# Patient Record
Sex: FEMALE | NOT HISPANIC OR LATINO | URBAN - METROPOLITAN AREA
[De-identification: names, ages, dates, MRNs, and addresses within clinical notes are randomized per-mention and may not be internally consistent; named-entity substitution may affect disease eponyms.]

---

## 2021-11-04 ENCOUNTER — APPOINTMENT (RX ONLY)
Dept: URBAN - METROPOLITAN AREA CLINIC 349 | Facility: CLINIC | Age: 76
Setting detail: DERMATOLOGY
End: 2021-11-04

## 2021-11-04 DIAGNOSIS — D22 MELANOCYTIC NEVI: ICD-10-CM

## 2021-11-04 DIAGNOSIS — Z12.83 ENCOUNTER FOR SCREENING FOR MALIGNANT NEOPLASM OF SKIN: ICD-10-CM

## 2021-11-04 DIAGNOSIS — Z71.89 OTHER SPECIFIED COUNSELING: ICD-10-CM

## 2021-11-04 DIAGNOSIS — L30.9 DERMATITIS, UNSPECIFIED: ICD-10-CM | Status: INADEQUATELY CONTROLLED

## 2021-11-04 DIAGNOSIS — L82.1 OTHER SEBORRHEIC KERATOSIS: ICD-10-CM

## 2021-11-04 DIAGNOSIS — D18.0 HEMANGIOMA: ICD-10-CM

## 2021-11-04 DIAGNOSIS — I78.8 OTHER DISEASES OF CAPILLARIES: ICD-10-CM | Status: INADEQUATELY CONTROLLED

## 2021-11-04 PROBLEM — D22.5 MELANOCYTIC NEVI OF TRUNK: Status: ACTIVE | Noted: 2021-11-04

## 2021-11-04 PROBLEM — D18.01 HEMANGIOMA OF SKIN AND SUBCUTANEOUS TISSUE: Status: ACTIVE | Noted: 2021-11-04

## 2021-11-04 PROCEDURE — ? PRESCRIPTION MEDICATION MANAGEMENT

## 2021-11-04 PROCEDURE — 17110 DESTRUCTION B9 LES UP TO 14: CPT

## 2021-11-04 PROCEDURE — ? EDUCATIONAL RESOURCES PROVIDED

## 2021-11-04 PROCEDURE — 99203 OFFICE O/P NEW LOW 30 MIN: CPT | Mod: 25

## 2021-11-04 PROCEDURE — ? ELECTRODESICCATION

## 2021-11-04 PROCEDURE — ? COUNSELING

## 2021-11-04 PROCEDURE — ? PRESCRIPTION

## 2021-11-04 RX ORDER — TRIAMCINOLONE ACETONIDE 1 MG/G
CREAM TOPICAL
Qty: 80 | Refills: 1 | Status: ERX | COMMUNITY
Start: 2021-11-04

## 2021-11-04 RX ADMIN — TRIAMCINOLONE ACETONIDE: 1 CREAM TOPICAL at 00:00

## 2021-11-04 ASSESSMENT — LOCATION SIMPLE DESCRIPTION DERM
LOCATION SIMPLE: RIGHT UPPER BACK
LOCATION SIMPLE: RIGHT POSTERIOR UPPER ARM
LOCATION SIMPLE: NOSE
LOCATION SIMPLE: UPPER BACK

## 2021-11-04 ASSESSMENT — LOCATION ZONE DERM
LOCATION ZONE: ARM
LOCATION ZONE: TRUNK
LOCATION ZONE: NOSE

## 2021-11-04 ASSESSMENT — LOCATION DETAILED DESCRIPTION DERM
LOCATION DETAILED: NASAL DORSUM
LOCATION DETAILED: RIGHT INFERIOR UPPER BACK
LOCATION DETAILED: RIGHT INFERIOR MEDIAL UPPER BACK
LOCATION DETAILED: RIGHT PROXIMAL POSTERIOR UPPER ARM
LOCATION DETAILED: INFERIOR THORACIC SPINE
LOCATION DETAILED: RIGHT MEDIAL UPPER BACK
LOCATION DETAILED: SUPERIOR THORACIC SPINE
LOCATION DETAILED: RIGHT SUPERIOR UPPER BACK

## 2021-11-04 NOTE — PROCEDURE: ELECTRODESICCATION
Medical Necessity Clause: This procedure was medically necessary because the lesions that were treated were:
Include Z78.9 (Other Specified Conditions Influencing Health Status) As An Associated Diagnosis?: No
Post-Care Instructions: I reviewed with the patient in detail post-care instructions. Patient is to wear sunprotection, and avoid picking at any of the treated lesions. Pt may apply Vaseline to crusted or scabbing areas
Consent: The patient's consent was obtained including but not limited to risks of crusting, scabbing, blistering, scarring, darker or lighter pigmentary change, recurrence, incomplete removal and infection.
Detail Level: Detailed
Medical Necessity Information: It is in your best interest to select a reason for this procedure from the list below. All of these items fulfill various CMS LCD requirements except the new and changing color options.

## 2022-11-04 ENCOUNTER — APPOINTMENT (RX ONLY)
Dept: URBAN - METROPOLITAN AREA CLINIC 329 | Facility: CLINIC | Age: 77
Setting detail: DERMATOLOGY
End: 2022-11-04

## 2022-11-04 DIAGNOSIS — L57.8 OTHER SKIN CHANGES DUE TO CHRONIC EXPOSURE TO NONIONIZING RADIATION: ICD-10-CM

## 2022-11-04 DIAGNOSIS — L71.8 OTHER ROSACEA: ICD-10-CM | Status: INADEQUATELY CONTROLLED

## 2022-11-04 DIAGNOSIS — D18.0 HEMANGIOMA: ICD-10-CM

## 2022-11-04 DIAGNOSIS — L82.0 INFLAMED SEBORRHEIC KERATOSIS: ICD-10-CM

## 2022-11-04 DIAGNOSIS — L82.1 OTHER SEBORRHEIC KERATOSIS: ICD-10-CM

## 2022-11-04 DIAGNOSIS — Z12.83 ENCOUNTER FOR SCREENING FOR MALIGNANT NEOPLASM OF SKIN: ICD-10-CM

## 2022-11-04 PROBLEM — D18.01 HEMANGIOMA OF SKIN AND SUBCUTANEOUS TISSUE: Status: ACTIVE | Noted: 2022-11-04

## 2022-11-04 PROCEDURE — 17110 DESTRUCTION B9 LES UP TO 14: CPT

## 2022-11-04 PROCEDURE — ? FULL BODY SKIN EXAM

## 2022-11-04 PROCEDURE — ? COUNSELING

## 2022-11-04 PROCEDURE — ? BENIGN DESTRUCTION

## 2022-11-04 PROCEDURE — ? PRESCRIPTION

## 2022-11-04 PROCEDURE — 99214 OFFICE O/P EST MOD 30 MIN: CPT | Mod: 25

## 2022-11-04 RX ORDER — METRONIDAZOLE 7.5 MG/G
CREAM TOPICAL
Qty: 45 | Refills: 2 | Status: ERX | COMMUNITY
Start: 2022-11-04

## 2022-11-04 RX ADMIN — METRONIDAZOLE: 7.5 CREAM TOPICAL at 00:00

## 2022-11-04 ASSESSMENT — LOCATION SIMPLE DESCRIPTION DERM
LOCATION SIMPLE: LEFT CHEEK
LOCATION SIMPLE: RIGHT CHEEK
LOCATION SIMPLE: NOSE
LOCATION SIMPLE: RIGHT UPPER BACK
LOCATION SIMPLE: ABDOMEN
LOCATION SIMPLE: CHEST
LOCATION SIMPLE: RIGHT CHEEK
LOCATION SIMPLE: RIGHT POSTERIOR UPPER ARM
LOCATION SIMPLE: LEFT UPPER BACK
LOCATION SIMPLE: ABDOMEN
LOCATION SIMPLE: LEFT LOWER BACK
LOCATION SIMPLE: CHEST
LOCATION SIMPLE: UPPER BACK
LOCATION SIMPLE: LEFT POSTERIOR UPPER ARM
LOCATION SIMPLE: LEFT FOREHEAD
LOCATION SIMPLE: LEFT CHEEK
LOCATION SIMPLE: RIGHT FOREHEAD
LOCATION SIMPLE: NOSE
LOCATION SIMPLE: LEFT LOWER BACK

## 2022-11-04 ASSESSMENT — LOCATION DETAILED DESCRIPTION DERM
LOCATION DETAILED: RIGHT PROXIMAL POSTERIOR UPPER ARM
LOCATION DETAILED: LEFT MEDIAL FOREHEAD
LOCATION DETAILED: LEFT PROXIMAL POSTERIOR UPPER ARM
LOCATION DETAILED: LEFT SUPERIOR MEDIAL MIDBACK
LOCATION DETAILED: LEFT CENTRAL MALAR CHEEK
LOCATION DETAILED: LEFT SUPERIOR MEDIAL LOWER BACK
LOCATION DETAILED: LEFT LATERAL ABDOMEN
LOCATION DETAILED: INFERIOR THORACIC SPINE
LOCATION DETAILED: LEFT MEDIAL UPPER BACK
LOCATION DETAILED: LEFT INFERIOR UPPER BACK
LOCATION DETAILED: EPIGASTRIC SKIN
LOCATION DETAILED: LEFT MEDIAL SUPERIOR CHEST
LOCATION DETAILED: LEFT INFERIOR MEDIAL MIDBACK
LOCATION DETAILED: RIGHT INFERIOR MEDIAL MALAR CHEEK
LOCATION DETAILED: MIDDLE STERNUM
LOCATION DETAILED: NASAL DORSUM
LOCATION DETAILED: NASAL DORSUM
LOCATION DETAILED: EPIGASTRIC SKIN
LOCATION DETAILED: RIGHT MEDIAL FOREHEAD
LOCATION DETAILED: LEFT SUPERIOR MEDIAL MIDBACK
LOCATION DETAILED: UPPER STERNUM
LOCATION DETAILED: LEFT MEDIAL MALAR CHEEK
LOCATION DETAILED: RIGHT INFERIOR NASAL CHEEK
LOCATION DETAILED: PERIUMBILICAL SKIN
LOCATION DETAILED: RIGHT INFERIOR UPPER BACK
LOCATION DETAILED: LEFT LATERAL ABDOMEN

## 2022-11-04 ASSESSMENT — LOCATION ZONE DERM
LOCATION ZONE: TRUNK
LOCATION ZONE: NOSE
LOCATION ZONE: NOSE
LOCATION ZONE: TRUNK
LOCATION ZONE: FACE
LOCATION ZONE: FACE
LOCATION ZONE: ARM

## 2022-11-04 NOTE — PROCEDURE: BENIGN DESTRUCTION
Render Note In Bullet Format When Appropriate: No
Detail Level: Detailed
Anesthesia Volume In Cc: 0.5
Medical Necessity Clause: This procedure was medically necessary because the lesions that were treated were:
Consent: The patient's consent was obtained including but not limited to risks of crusting, scabbing, blistering, scarring, darker or lighter pigmentary change, recurrence, incomplete removal and infection.
Treatment Number (Will Not Render If 0): 0
Medical Necessity Information: It is in your best interest to select a reason for this procedure from the list below. All of these items fulfill various CMS LCD requirements except the new and changing color options.
Post-Care Instructions: I reviewed with the patient in detail post-care instructions. Patient is to wear sunprotection, and avoid picking at any of the treated lesions. Pt may apply Vaseline to crusted or scabbing areas.

## 2023-01-13 ENCOUNTER — TELEPHONE (OUTPATIENT)
Dept: ORTHOPEDIC SURGERY | Age: 78
End: 2023-01-13

## 2023-01-13 NOTE — TELEPHONE ENCOUNTER
Pt called she had sx 2019 [facility],  Has sx.she has pain she dropped her records  In oct at es .  Did you get those  Thanks

## 2023-05-23 ENCOUNTER — OFFICE VISIT (OUTPATIENT)
Dept: ORTHOPEDIC SURGERY | Age: 78
End: 2023-05-23
Payer: MEDICARE

## 2023-05-23 DIAGNOSIS — Z96.652 PRESENCE OF LEFT ARTIFICIAL KNEE JOINT: Primary | ICD-10-CM

## 2023-05-23 DIAGNOSIS — Z96.651 HISTORY OF TOTAL KNEE ARTHROPLASTY, RIGHT: ICD-10-CM

## 2023-05-23 PROBLEM — E78.2 MIXED HYPERLIPIDEMIA: Status: ACTIVE | Noted: 2018-10-04

## 2023-05-23 PROBLEM — K57.30 DIVERTICULOSIS OF COLON: Status: ACTIVE | Noted: 2021-02-24

## 2023-05-23 PROBLEM — G89.29 CHRONIC LEFT-SIDED LOW BACK PAIN WITHOUT SCIATICA: Status: ACTIVE | Noted: 2021-02-23

## 2023-05-23 PROBLEM — F51.01 PRIMARY INSOMNIA: Status: ACTIVE | Noted: 2019-04-30

## 2023-05-23 PROBLEM — M54.50 CHRONIC LEFT-SIDED LOW BACK PAIN WITHOUT SCIATICA: Status: ACTIVE | Noted: 2021-02-23

## 2023-05-23 PROBLEM — K21.9 GASTROESOPHAGEAL REFLUX DISEASE WITHOUT ESOPHAGITIS: Status: ACTIVE | Noted: 2018-10-04

## 2023-05-23 PROCEDURE — 99204 OFFICE O/P NEW MOD 45 MIN: CPT | Performed by: ORTHOPAEDIC SURGERY

## 2023-05-23 PROCEDURE — 1123F ACP DISCUSS/DSCN MKR DOCD: CPT | Performed by: ORTHOPAEDIC SURGERY

## 2023-05-23 NOTE — PROGRESS NOTES
Name: Suzan Briscoe  YOB: 1945  Gender: female  MRN: [id number]    CC:   Chief Complaint   Patient presents with    Knee Pain     Left TKA 2nd opinion          HPI: Suzan Briscoe is a 69 y.o. female with a PMHx of GERD, HLD, and s/p right TKA in 2013 in [place] by Dr. Yue Cintron and left TKA in 2019 by Dr. Darling Nurse  here for evaluation of left knee pain. The pain has been present for 3 years and is not getting better. The pain is primarily on the anterior side almost along her incision   she describes the pain as a constant ache that is intermittently sharp with activity  The pain is worse with going up and/or down stairs, rising after sitting, standing, walking, and at night, often waking them from sleep  The pain does not radiate down the leg. she denies any fever, chills, nausea, vomiting  Treatment so far has been physical therapy without functional improvement, activity modification, Tylenol, and celebrex with little relief. Not on File      Review of Systems:  As per HPI. Pertinent positives and negatives are addressed with the patient, particularly those related to musculoskeletal concerns. Non-orthopaedic concerns were referred back to the primary care physician. PHYSICAL EXAMINATION:   The patient appears their stated age and they are in no distress. There were no vitals taken for this visit. The lower extremities are as described below. Circulation is normal with palpable pedal pulses bilaterally and no edema. There is no lymph adenopathy in the popliteal or malleolar region. The skin is without stasis disease distally bilaterally. Sensation is intact to light touch bilaterally. There is severe tenderness to palpation over the anterior aspect of the left knee(s). Very tender over the patellar tendon. Tenderness to light touch. Positive Tinel's.   The gait is noted to be with a slight trendelenburg and antalgia  Range of motion is 0-120 degrees on the

## 2023-05-25 ENCOUNTER — TELEPHONE (OUTPATIENT)
Dept: ORTHOPEDIC SURGERY | Age: 78
End: 2023-05-25

## 2023-05-25 DIAGNOSIS — Z96.652 PRESENCE OF LEFT ARTIFICIAL KNEE JOINT: ICD-10-CM

## 2023-05-25 DIAGNOSIS — Z96.651 HISTORY OF TOTAL KNEE ARTHROPLASTY, RIGHT: ICD-10-CM

## 2023-05-25 NOTE — TELEPHONE ENCOUNTER
Pt states that no one from lab services has called her to schedule the us/labs.  Betsey Camp would like call back

## 2023-05-26 LAB
BASOPHILS # BLD: 0.1 K/UL (ref 0–0.2)
BASOPHILS NFR BLD: 2 % (ref 0–2)
DIFFERENTIAL METHOD BLD: NORMAL
EOSINOPHIL # BLD: 0.2 K/UL (ref 0–0.8)
EOSINOPHIL NFR BLD: 3 % (ref 0.5–7.8)
ERYTHROCYTE [DISTWIDTH] IN BLOOD BY AUTOMATED COUNT: 14.6 % (ref 11.9–14.6)
ERYTHROCYTE [SEDIMENTATION RATE] IN BLOOD: 7 MM/HR (ref 0–30)
HCT VFR BLD AUTO: 43.7 % (ref 35.8–46.3)
HGB BLD-MCNC: 13.7 G/DL (ref 11.7–15.4)
IMM GRANULOCYTES # BLD AUTO: 0.1 K/UL (ref 0–0.5)
IMM GRANULOCYTES NFR BLD AUTO: 1 % (ref 0–5)
LYMPHOCYTES # BLD: 1.4 K/UL (ref 0.5–4.6)
LYMPHOCYTES NFR BLD: 21 % (ref 13–44)
MCH RBC QN AUTO: 30.2 PG (ref 26.1–32.9)
MCHC RBC AUTO-ENTMCNC: 31.4 G/DL (ref 31.4–35)
MCV RBC AUTO: 96.5 FL (ref 82–102)
MONOCYTES # BLD: 0.5 K/UL (ref 0.1–1.3)
MONOCYTES NFR BLD: 8 % (ref 4–12)
NEUTS SEG # BLD: 4.2 K/UL (ref 1.7–8.2)
NEUTS SEG NFR BLD: 65 % (ref 43–78)
NRBC # BLD: 0 K/UL (ref 0–0.2)
PLATELET # BLD AUTO: 233 K/UL (ref 150–450)
PMV BLD AUTO: 11.4 FL (ref 9.4–12.3)
RBC # BLD AUTO: 4.53 M/UL (ref 4.05–5.2)
WBC # BLD AUTO: 6.4 K/UL (ref 4.3–11.1)

## 2023-05-27 LAB — CRP SERPL-MCNC: <0.3 MG/DL (ref 0–0.9)

## 2023-06-01 ENCOUNTER — TELEPHONE (OUTPATIENT)
Dept: ORTHOPEDIC SURGERY | Age: 78
End: 2023-06-01

## 2023-06-01 ENCOUNTER — TRANSCRIBE ORDERS (OUTPATIENT)
Dept: SCHEDULING | Age: 78
End: 2023-06-01

## 2023-06-01 DIAGNOSIS — M25.562 LEFT KNEE PAIN, UNSPECIFIED CHRONICITY: ICD-10-CM

## 2023-06-01 DIAGNOSIS — Z96.652 PRESENCE OF LEFT ARTIFICIAL KNEE JOINT: Primary | ICD-10-CM

## 2023-06-01 NOTE — TELEPHONE ENCOUNTER
Called pt and she was aware of her lab results through [address] I addressed her ultrasound order that was marked as complete that she said she never went to have done. I called radiology and they keyed a new order and made the patient an appointment for today's order. I completed this action by faxing the order to [facility].

## 2023-06-02 ENCOUNTER — HOSPITAL ENCOUNTER (OUTPATIENT)
Dept: ULTRASOUND IMAGING | Age: 78
End: 2023-06-02
Payer: MEDICARE

## 2023-06-02 DIAGNOSIS — Z96.652 PRESENCE OF LEFT ARTIFICIAL KNEE JOINT: ICD-10-CM

## 2023-06-02 DIAGNOSIS — M25.562 LEFT KNEE PAIN, UNSPECIFIED CHRONICITY: ICD-10-CM

## 2023-06-02 PROCEDURE — 76882 US LMTD JT/FCL EVL NVASC XTR: CPT

## 2023-07-18 ENCOUNTER — OFFICE VISIT (OUTPATIENT)
Dept: ORTHOPEDIC SURGERY | Age: 78
End: 2023-07-18
Payer: MEDICARE

## 2023-07-18 DIAGNOSIS — M25.562 LEFT KNEE PAIN, UNSPECIFIED CHRONICITY: ICD-10-CM

## 2023-07-18 DIAGNOSIS — Z96.652 PRESENCE OF LEFT ARTIFICIAL KNEE JOINT: Primary | ICD-10-CM

## 2023-07-18 PROCEDURE — 99213 OFFICE O/P EST LOW 20 MIN: CPT | Performed by: ORTHOPAEDIC SURGERY

## 2023-07-18 PROCEDURE — 1123F ACP DISCUSS/DSCN MKR DOCD: CPT | Performed by: ORTHOPAEDIC SURGERY

## 2023-07-18 RX ORDER — BACLOFEN 10 MG/1
5-10 TABLET ORAL 3 TIMES DAILY PRN
COMMUNITY
Start: 2021-05-04

## 2023-07-18 RX ORDER — GABAPENTIN 100 MG/1
100 CAPSULE ORAL 3 TIMES DAILY
Qty: 60 CAPSULE | Refills: 0 | Status: SHIPPED | OUTPATIENT
Start: 2023-07-18 | End: 2023-08-07

## 2023-07-18 RX ORDER — CELECOXIB 200 MG/1
200 CAPSULE ORAL DAILY
Qty: 30 CAPSULE | Refills: 3 | Status: SHIPPED | OUTPATIENT
Start: 2023-07-18

## 2023-07-18 RX ORDER — ALPRAZOLAM 0.5 MG/1
0.5 TABLET ORAL DAILY PRN
COMMUNITY
Start: 2023-03-10

## 2023-07-18 RX ORDER — DULOXETIN HYDROCHLORIDE 30 MG/1
30 CAPSULE, DELAYED RELEASE ORAL 2 TIMES DAILY
COMMUNITY
Start: 2013-04-04

## 2023-07-18 NOTE — PROGRESS NOTES
over the Pez anserine bursa. Range of motion of the left knee is 0 to 120 degrees. Fair quad strength with no extensor lag no instability to varus valgus stress no AP instability. Calves are soft nontender. Neurovascular exam is normal.    Imaging:   none    Assessment:   The patient has a painful left total knee. I can see no evidence of infection. I see no evidence of loosening. She has no instability. She has pain over the medial side of the knee in the area of the infrapatellar branch of the saphenous nerve. I have recommended that we send her for ablation of this nerve. Hopefully her symptoms will improve. I have also recommended that she start on Neurontin 100 mg to 300 mg nightly. I have also given her prescription for Celebrex which she has requested. 3--this is a stable chronic illness/condition    Plan:   Follow-up in 4 months.

## 2023-09-05 DIAGNOSIS — M25.562 LEFT KNEE PAIN, UNSPECIFIED CHRONICITY: ICD-10-CM

## 2023-09-05 DIAGNOSIS — Z96.652 PRESENCE OF LEFT ARTIFICIAL KNEE JOINT: ICD-10-CM

## 2023-09-06 DIAGNOSIS — M25.562 LEFT KNEE PAIN, UNSPECIFIED CHRONICITY: ICD-10-CM

## 2023-09-06 DIAGNOSIS — Z96.652 PRESENCE OF LEFT ARTIFICIAL KNEE JOINT: ICD-10-CM

## 2023-09-06 RX ORDER — GABAPENTIN 100 MG/1
CAPSULE ORAL
Qty: 60 CAPSULE | Refills: 0 | OUTPATIENT
Start: 2023-09-06

## 2023-09-11 DIAGNOSIS — M25.562 LEFT KNEE PAIN, UNSPECIFIED CHRONICITY: ICD-10-CM

## 2023-09-11 DIAGNOSIS — Z96.652 PRESENCE OF LEFT ARTIFICIAL KNEE JOINT: ICD-10-CM

## 2023-09-11 RX ORDER — GABAPENTIN 100 MG/1
100 CAPSULE ORAL 3 TIMES DAILY
Qty: 60 CAPSULE | Refills: 0 | Status: CANCELLED | OUTPATIENT
Start: 2023-09-11 | End: 2023-10-01

## 2023-09-11 RX ORDER — GABAPENTIN 100 MG/1
100 CAPSULE ORAL 3 TIMES DAILY
Qty: 60 CAPSULE | Refills: 0 | OUTPATIENT
Start: 2023-09-11 | End: 2023-10-01

## 2023-09-13 ENCOUNTER — TELEPHONE (OUTPATIENT)
Dept: ORTHOPEDIC SURGERY | Age: 78
End: 2023-09-13

## 2023-09-13 DIAGNOSIS — M25.562 LEFT KNEE PAIN, UNSPECIFIED CHRONICITY: ICD-10-CM

## 2023-09-13 DIAGNOSIS — Z96.652 PRESENCE OF LEFT ARTIFICIAL KNEE JOINT: ICD-10-CM

## 2023-09-13 RX ORDER — GABAPENTIN 100 MG/1
100 CAPSULE ORAL 3 TIMES DAILY
Qty: 90 CAPSULE | Refills: 0 | Status: SHIPPED | OUTPATIENT
Start: 2023-09-13 | End: 2023-10-13

## 2023-10-26 ENCOUNTER — HOSPITAL ENCOUNTER (OUTPATIENT)
Dept: PHYSICAL THERAPY | Age: 78
Setting detail: RECURRING SERIES
Discharge: HOME | End: 2023-10-29
Payer: MEDICARE

## 2023-10-26 DIAGNOSIS — N39.41 URGE INCONTINENCE: ICD-10-CM

## 2023-10-26 DIAGNOSIS — R27.8 OTHER LACK OF COORDINATION: Primary | ICD-10-CM

## 2023-10-26 DIAGNOSIS — R15.9 FULL INCONTINENCE OF FECES: ICD-10-CM

## 2023-10-26 PROCEDURE — 97162 PT EVAL MOD COMPLEX 30 MIN: CPT

## 2023-10-26 PROCEDURE — 97530 THERAPEUTIC ACTIVITIES: CPT

## 2023-10-26 ASSESSMENT — PAIN SCALES - GENERAL: PAINLEVEL_OUTOF10: 0

## 2023-10-26 NOTE — PROGRESS NOTES
Media  Benefits  MyChart    Future Appointments   Date Time Provider [address]   11/2/2023  8:00 AM Kathleen Tay PT [facility] JOSHUA   11/9/2023 10:00 AM SONIA Rodríguez   11/16/2023  9:00 AM SONIA Rodríguez   11/30/2023 10:00 AM SONIA RodríguezE

## 2023-11-02 ENCOUNTER — HOSPITAL ENCOUNTER (OUTPATIENT)
Dept: PHYSICAL THERAPY | Age: 78
Setting detail: RECURRING SERIES
Discharge: HOME | End: 2023-11-05
Payer: MEDICARE

## 2023-11-02 PROCEDURE — 97530 THERAPEUTIC ACTIVITIES: CPT

## 2023-11-02 PROCEDURE — 97110 THERAPEUTIC EXERCISES: CPT

## 2023-11-02 ASSESSMENT — PAIN SCALES - GENERAL: PAINLEVEL_OUTOF10: 0

## 2023-11-02 NOTE — PROGRESS NOTES
Tender? Intermediate PFM Tender? Deep PFM Tender? Superficial Transverse Perineal [] Right  [] Left  []DNT Deep Transverse Perineal [] Right  [] Left  []DNT Puborectalis [] Right  [] Left  []DNT   Ischiocavernosus [] Right  [] Left  []DNT Compressor Urethra [] Right  [] Left  []DNT Pubococcygeus [] Right  [] Left  []DNT   Bulbocavernosus [] Right  [] Left  []DNT   Iliococcygeus [] Right  [] Left  []DNT       Obturator Internus [] Right  [] Left  []DNT       Coccygeus [] Right  [] Left  []DNT     Breath assessment:  Observation:  good demonstration of diaphragmatic breathing  Coordination of pelvic floor muscles with breath: minimal pelvic floor muscle excursion  Co-contraction of PFM with transversus abdominis: able with cuing    Treatment   THERAPEUTIC EXERCISE: (10 minutes): Exercises per grid below to improve mobility, strength, and coordination. Required minimal visual, verbal, and tactile cues to promote proper body mechanics and promote proper body breathing techniques. Progressed resistance, range, and repetitions as indicated. Date:  11/2/23 Date:   Date:     Activity/Exercise Parameters Parameters Parameters   PFM activation 3 x 6 QF    10 x 4 sec holds with normal breathing    Endurance holds added to HEP                   THERAPEUTIC ACTIVITY: (43 minutes): Functional activity education regarding anatomy, pathology and role of pelvic floor muscle (PFM) function in relation to presenting symptoms and role of pelvic floor therapy in conservative treatment., Instruction on coordinated pelvic floor and diaphragmatic breathing to improve kinesthetic awareness of pelvic muscle mobility and restore proper motor recruitment patterns with breathing, posture, and functional movement (e.g. appropriate lift/contraction with increased IAP such as a cough, laugh, sneeze to prevent incontinence as well as appropriate relaxation/drop to reduce pain with vaginal penetration). , and Bowel health education (see

## 2023-11-06 ENCOUNTER — APPOINTMENT (RX ONLY)
Dept: URBAN - METROPOLITAN AREA CLINIC 329 | Facility: CLINIC | Age: 78
Setting detail: DERMATOLOGY
End: 2023-11-06

## 2023-11-06 DIAGNOSIS — D22 MELANOCYTIC NEVI: ICD-10-CM

## 2023-11-06 DIAGNOSIS — D18.0 HEMANGIOMA: ICD-10-CM

## 2023-11-06 DIAGNOSIS — L82.0 INFLAMED SEBORRHEIC KERATOSIS: ICD-10-CM

## 2023-11-06 DIAGNOSIS — L82.1 OTHER SEBORRHEIC KERATOSIS: ICD-10-CM

## 2023-11-06 DIAGNOSIS — Z71.89 OTHER SPECIFIED COUNSELING: ICD-10-CM

## 2023-11-06 DIAGNOSIS — Z12.83 ENCOUNTER FOR SCREENING FOR MALIGNANT NEOPLASM OF SKIN: ICD-10-CM

## 2023-11-06 DIAGNOSIS — L57.8 OTHER SKIN CHANGES DUE TO CHRONIC EXPOSURE TO NONIONIZING RADIATION: ICD-10-CM

## 2023-11-06 DIAGNOSIS — L81.4 OTHER MELANIN HYPERPIGMENTATION: ICD-10-CM

## 2023-11-06 PROBLEM — D22.61 MELANOCYTIC NEVI OF RIGHT UPPER LIMB, INCLUDING SHOULDER: Status: ACTIVE | Noted: 2023-11-06

## 2023-11-06 PROBLEM — D22.71 MELANOCYTIC NEVI OF RIGHT LOWER LIMB, INCLUDING HIP: Status: ACTIVE | Noted: 2023-11-06

## 2023-11-06 PROBLEM — D22.72 MELANOCYTIC NEVI OF LEFT LOWER LIMB, INCLUDING HIP: Status: ACTIVE | Noted: 2023-11-06

## 2023-11-06 PROBLEM — D18.01 HEMANGIOMA OF SKIN AND SUBCUTANEOUS TISSUE: Status: ACTIVE | Noted: 2023-11-06

## 2023-11-06 PROBLEM — D22.5 MELANOCYTIC NEVI OF TRUNK: Status: ACTIVE | Noted: 2023-11-06

## 2023-11-06 PROCEDURE — ? COUNSELING

## 2023-11-06 PROCEDURE — ? SUNSCREEN RECOMMENDATIONS

## 2023-11-06 PROCEDURE — ? LIQUID NITROGEN

## 2023-11-06 PROCEDURE — 99213 OFFICE O/P EST LOW 20 MIN: CPT | Mod: 25

## 2023-11-06 PROCEDURE — 17110 DESTRUCTION B9 LES UP TO 14: CPT

## 2023-11-06 PROCEDURE — ? FULL BODY SKIN EXAM

## 2023-11-06 PROCEDURE — ? TREATMENT REGIMEN

## 2023-11-06 ASSESSMENT — LOCATION DETAILED DESCRIPTION DERM
LOCATION DETAILED: RIGHT CLAVICULAR SKIN
LOCATION DETAILED: RIGHT ANTERIOR DISTAL THIGH
LOCATION DETAILED: LEFT ANTERIOR DISTAL UPPER ARM
LOCATION DETAILED: LEFT PROXIMAL POSTERIOR THIGH
LOCATION DETAILED: RIGHT SUPERIOR MEDIAL UPPER BACK
LOCATION DETAILED: LEFT CENTRAL MALAR CHEEK
LOCATION DETAILED: RIGHT ANTERIOR DISTAL UPPER ARM
LOCATION DETAILED: EPIGASTRIC SKIN
LOCATION DETAILED: LEFT INFERIOR UPPER BACK
LOCATION DETAILED: LEFT DISTAL POSTERIOR THIGH
LOCATION DETAILED: MID-FRONTAL SCALP
LOCATION DETAILED: RIGHT LATERAL ABDOMEN
LOCATION DETAILED: UPPER STERNUM
LOCATION DETAILED: LEFT DISTAL CALF
LOCATION DETAILED: SUPERIOR THORACIC SPINE
LOCATION DETAILED: RIGHT LATERAL ABDOMEN
LOCATION DETAILED: RIGHT PROXIMAL DORSAL FOREARM
LOCATION DETAILED: RIGHT VENTRAL DISTAL FOREARM

## 2023-11-06 ASSESSMENT — LOCATION SIMPLE DESCRIPTION DERM
LOCATION SIMPLE: RIGHT CLAVICULAR SKIN
LOCATION SIMPLE: RIGHT THIGH
LOCATION SIMPLE: ABDOMEN
LOCATION SIMPLE: LEFT UPPER ARM
LOCATION SIMPLE: LEFT CALF
LOCATION SIMPLE: LEFT CHEEK
LOCATION SIMPLE: CHEST
LOCATION SIMPLE: LEFT UPPER BACK
LOCATION SIMPLE: ANTERIOR SCALP
LOCATION SIMPLE: LEFT POSTERIOR THIGH
LOCATION SIMPLE: RIGHT FOREARM
LOCATION SIMPLE: UPPER BACK
LOCATION SIMPLE: RIGHT UPPER ARM
LOCATION SIMPLE: ABDOMEN
LOCATION SIMPLE: RIGHT UPPER BACK

## 2023-11-06 ASSESSMENT — LOCATION ZONE DERM
LOCATION ZONE: LEG
LOCATION ZONE: TRUNK
LOCATION ZONE: ARM
LOCATION ZONE: SCALP
LOCATION ZONE: FACE
LOCATION ZONE: TRUNK

## 2023-11-06 NOTE — PROCEDURE: LIQUID NITROGEN
Show Topical Anesthesia Variable?: Yes
Post-Care Instructions: I reviewed with the patient in detail post-care instructions. Patient is to wear sunprotection, and avoid picking at any of the treated lesions. Pt may apply Vaseline to crusted or scabbing areas.
Medical Necessity Clause: This procedure was medically necessary because the lesions that were treated were: bleeding and enlarging
Include Z78.9 (Other Specified Conditions Influencing Health Status) As An Associated Diagnosis?: No
Medical Necessity Information: It is in your best interest to select a reason for this procedure from the list below. All of these items fulfill various CMS LCD requirements except the new and changing color options.
Detail Level: Detailed
Consent: The patient's consent was obtained including but not limited to risks of crusting, scabbing, blistering, scarring, darker or lighter pigmentary change, recurrence, incomplete removal and infection.
Spray Paint Text: The liquid nitrogen was applied to the skin utilizing a spray paint frosting technique.

## 2023-11-09 ENCOUNTER — HOSPITAL ENCOUNTER (OUTPATIENT)
Dept: PHYSICAL THERAPY | Age: 78
Setting detail: RECURRING SERIES
Discharge: HOME | End: 2023-11-12
Payer: MEDICARE

## 2023-11-09 PROCEDURE — 97530 THERAPEUTIC ACTIVITIES: CPT

## 2023-11-09 PROCEDURE — 97110 THERAPEUTIC EXERCISES: CPT

## 2023-11-09 ASSESSMENT — PAIN SCALES - GENERAL: PAINLEVEL_OUTOF10: 0

## 2023-11-09 NOTE — PROGRESS NOTES
management 10/26/23   Constipation care     Diarrhea/Fecal leakage     Colonic massage     Toilet positioning     Defecation dynamics     Sources of fiber     Return to intercourse/vaginal trainers/wand     Perineal massage     Sexual positioning     Lubricants/vaginal moisturizers     Vulvovaginal health/vaginal irritants     Body mechanics Discussed and demonstrated STS, log roll, and lifting body/breathing mechanics, practiced with STS and log roll with patient, answered patient questions, handout provided  Practiced with STS and log roll 11/2/23 11/9/23   Posture/ergonomics     Diaphragmatic breathing Demonstrated, practiced, and reviewed; handout provided  Practiced, discussed use with pessary removal to improve ease of removal  Practiced 10/26/23    11/2/23    11/9/23   Pain science education     Resources and technology     Other patient education       MANUAL THERAPY: (0 minutes): Soft tissue mobilization was utilized and necessary because of the patient's restricted motion of soft tissue. Date Type Location Comments   11/9/2023 Internal assessment/treatment Via vaginal canal NOT TODAY                                       (Used abbreviations: MET - muscle energy technique; SCS- Strain counter strain; CTM-Connective tissue mobilizations; CR- Contract/Relax; SP- Sustained pressure; PIT- Positional inhibition techniques; STM Soft -tissue mobilization; MM- Myofascial mobilization; TrP-Trigger point release; IASTM- Instrument assisted soft tissue mobilizations, TDN-Trigger point dry needling)    Pt gives verbal consent to internal vaginal assessment/treatment without chaperon present. - NOT PERFORMED TODAY    Treatment/Session Summary:    >Treatment Assessment:   Response to treatment: Pt did well with body/breathing mechanics today and performed STS and log roll well. Pt did well with initiation of hip stabilization exercises today.  Pt reports good understanding of plan of care, as well as prescribed

## 2023-11-16 ENCOUNTER — HOSPITAL ENCOUNTER (OUTPATIENT)
Dept: PHYSICAL THERAPY | Age: 78
Setting detail: RECURRING SERIES
Discharge: HOME | End: 2023-11-19
Payer: MEDICARE

## 2023-11-16 PROCEDURE — 97110 THERAPEUTIC EXERCISES: CPT

## 2023-11-16 PROCEDURE — 97530 THERAPEUTIC ACTIVITIES: CPT

## 2023-11-16 ASSESSMENT — PAIN SCALES - GENERAL: PAINLEVEL_OUTOF10: 0

## 2023-11-16 NOTE — PROGRESS NOTES
and \"checklist\" to consult in presence of greater symptoms. Pt reports good understanding of plan of care, as well as prescribed home exercise program (DB, PFM activation exercises, bridge + PFM, urinary urge suppression as needed, body/breathing mechanics, sleep hygiene). All questions were answered to pt's satisfaction. Pt was invited to call with any further questions or concerns. Communication/Consultation:  None today  Equipment provided today:  HEP  Recommendations/Intent for next treatment session: Next visit will focus on PFM coordination and strengthening exercises, bladder health education, review/practice with urinary urge suppression (tactile and verbal cueing for QF as needed), nocturia management as needed, bowel health education (bowel irritants) as needed, practice with body/breathing mechanics as needed. Variation from POC: will follow-up in two weeks.     >Total Treatment Billable Duration: 45 minutes  Time In: 9521  Time Out: Karel Jarrett PT       Charge Capture   Post Session Pain  PT Visit Info  [facility] Portal  MD Guidelines  Scanned Media  Benefits  MyChart    Future Appointments   Date Time Provider [facility] [address]   11/30/2023 10:00 AM Frieda Smith PT [facility] SFJOHN   12/7/2023 10:00 AM Frieda Smith PT JACKIE LEWIS   12/14/2023 10:00 AM Frieda Smith PT JACKIE LEWIS   12/19/2023 10:00 AM Frieda Smith PT JACKIE LEWIS

## 2023-11-27 ENCOUNTER — OFFICE VISIT (OUTPATIENT)
Dept: ORTHOPEDIC SURGERY | Age: 78
End: 2023-11-27
Payer: MEDICARE

## 2023-11-27 DIAGNOSIS — Z96.652 PRESENCE OF LEFT ARTIFICIAL KNEE JOINT: Primary | ICD-10-CM

## 2023-11-27 DIAGNOSIS — M70.50 PES ANSERINE BURSITIS: ICD-10-CM

## 2023-11-27 PROCEDURE — 99214 OFFICE O/P EST MOD 30 MIN: CPT | Performed by: PHYSICIAN ASSISTANT

## 2023-11-27 PROCEDURE — 1123F ACP DISCUSS/DSCN MKR DOCD: CPT | Performed by: PHYSICIAN ASSISTANT

## 2023-11-27 RX ORDER — PREDNISONE 10 MG/1
10 TABLET ORAL SEE ADMIN INSTRUCTIONS
Qty: 48 EACH | Refills: 0 | Status: SHIPPED | OUTPATIENT
Start: 2023-11-27 | End: 2023-12-09

## 2023-11-27 NOTE — PROGRESS NOTES
Name: Nara Baron  YOB: 1945  Gender: female  MRN: [id number]    CC: Follow-up (Left TKA dos- 2019 )       HPI: Nara Baron is a 69 y.o. female who presents with Follow-up (Left TKA dos- 2019 )  The patient returns today for follow-up of painful left total knee arthroplasty performed by Dr. Fifi Whitfield in 2019. She has undergone CBC, sed rate, C-reactive protein which were all normal indicating no evidence of infection. She also had an ultrasound of her left knee revealing a small popliteal cyst.  There was no evidence of any patellar tendon injury. The patient's symptoms have not changed. She still is experiencing discomfort and pain over the anterior medial aspect of the left knee in the area of the joint line. The pain is present whether she is ambulating or at rest.  She also experiences night pain  She also describes a few days of pain under the kneecap which felt more like catching sensation like something was loose. This was painful primarily with standing and walking. She has been taking Celebrex sparingly as this aggravates her GERD as well as gabapentin with minimal relief. History was obtained by patient    ROS/Meds/PSH/PMH/FH/SH: I personally reviewed the patients standard intake form. Current Outpatient Medications:     predniSONE 10 MG (48) TBPK, Take 10 mg by mouth See Admin Instructions for 12 days, Disp: 48 each, Rfl: 0    gabapentin (NEURONTIN) 100 MG capsule, Take 1 capsule by mouth 3 times daily for 30 days. , Disp: 90 capsule, Rfl: 0    ALPRAZolam (XANAX) 0.5 MG tablet, Take 1 tablet by mouth daily as needed.  Max Daily Amount: 0.5 mg, Disp: , Rfl:     ascorbic acid (VITAMIN C) 1000 MG tablet, by Oral/Gastric Tube route, Disp: , Rfl:     baclofen (LIORESAL) 10 MG tablet, Take 0.5-1 tablets by mouth 3 times daily as needed, Disp: , Rfl:     vitamin D 25 MCG (1000 UT) CAPS, Take by mouth, Disp: , Rfl:     DULoxetine (CYMBALTA) 30 MG

## 2023-11-30 ENCOUNTER — HOSPITAL ENCOUNTER (OUTPATIENT)
Dept: PHYSICAL THERAPY | Age: 78
Setting detail: RECURRING SERIES
End: 2023-11-30
Payer: MEDICARE

## 2023-11-30 PROCEDURE — 97530 THERAPEUTIC ACTIVITIES: CPT

## 2023-11-30 ASSESSMENT — PAIN SCALES - GENERAL: PAINLEVEL_OUTOF10: 0

## 2023-11-30 NOTE — PROGRESS NOTES
Jono Mendez  : 1945  Primary: [employer] Javed Ppo (Medicare Managed)  [address]:   [address] [facility] [address]  Phone: [phone]  Fax: [phone] Plan Frequency: 1x/week for 90 days    Plan of Care/Certification Expiration Date: 24      >PT Visit Info:  Plan Frequency: 1x/week for 90 days  Plan of Care/Certification Expiration Date: 24  Total # of Visits to Date: 5  Progress Note Due Date: 24 (or at 10th visit)     Visit Count:  5    OUTPATIENT PHYSICAL THERAPY: Treatment Note 2023  Episode  }Appt Desk             Treatment Diagnosis:    Other lack of coordination  Urge incontinence  Full incontinence of feces  Contributing Diagnosis:  Constipation, unspecified (K59.00)  Hesitancy of micturition (R39.11)  Lower abdominal pain, unspecified (R10.30)  Nocturia (R35.1)  Overactive bladder (Detrusor muscle hyperactivity) (N32.81)  Pelvic and perineal pain (R10.2)  Pelvic floor dysfunction in female (M62.98)  Medical/Referring Diagnosis:    Urge incontinence  Overactive bladder  Constipation  Fecal incontinence  Referring Physician:  ZANDER Seaman NP (returns 2023)  MD Orders:  PT Eval and Treat   Date of Onset:  Onset Date:  (Chronic)     Allergies:   Patient has no allergy information on record. Restrictions/Precautions:  Restrictions/Precautions: None  Required Braces or Orthoses?: No  No data recorded   Interventions Planned (Treatment may consist of any combination of the following):    Current Treatment Recommendations: Strengthening; ROM; Endurance training; Neuromuscular re-education; Manual; Pain management; Home exercise program; Patient/Caregiver education & training; Modalities;  Therapeutic activities     >Subjective Comments:   UUI, nocturia (3-4x/night), FI, constipation (fluctuations in stool consistency and frequency of BM), occasional lower abdominal/pelvic pain

## 2023-12-06 NOTE — PROGRESS NOTES
Sanam Garner  : 1945  Primary: Ayush Kim Ppo (Medicare Managed)  Secondary:  [facility] [place] [address] [facility] [address]  Phone: [phone]  Fax: [phone] Plan Frequency: 1x/week for 90 days    Plan of Care/Certification Expiration Date: 24      >PT Visit Info:  Plan Frequency: 1x/week for 90 days  Plan of Care/Certification Expiration Date: 24  Total # of Visits to Date: 6  Progress Note Due Date: 24 (or at 10th visit)     Visit Count:  6    OUTPATIENT PHYSICAL THERAPY: Treatment Note 2023  Episode  }Appt Desk             Treatment Diagnosis:    Other lack of coordination  Urge incontinence  Full incontinence of feces  Contributing Diagnosis:  Constipation, unspecified (K59.00)  Hesitancy of micturition (R39.11)  Lower abdominal pain, unspecified (R10.30)  Nocturia (R35.1)  Overactive bladder (Detrusor muscle hyperactivity) (N32.81)  Pelvic and perineal pain (R10.2)  Pelvic floor dysfunction in female (M62.98)  Medical/Referring Diagnosis:    Urge incontinence  Overactive bladder  Constipation  Fecal incontinence  Referring Physician:  ZANDER Romero NP (returns 2023)  MD Orders:  PT Eval and Treat   Date of Onset:  Onset Date:  (Chronic)     Allergies:   Patient has no allergy information on record. Restrictions/Precautions:  Restrictions/Precautions: None  Required Braces or Orthoses?: No  No data recorded   Interventions Planned (Treatment may consist of any combination of the following):    Current Treatment Recommendations: Strengthening; ROM; Endurance training; Neuromuscular re-education; Manual; Pain management; Home exercise program; Patient/Caregiver education & training; Modalities;  Therapeutic activities     >Subjective Comments:   UUI, nocturia (3-4x/night), FI, constipation (fluctuations in stool consistency and frequency of BM), occasional lower abdominal/pelvic pain

## 2023-12-07 ENCOUNTER — HOSPITAL ENCOUNTER (OUTPATIENT)
Dept: PHYSICAL THERAPY | Age: 78
Setting detail: RECURRING SERIES
Discharge: HOME | End: 2023-12-10
Payer: MEDICARE

## 2023-12-07 PROCEDURE — 97530 THERAPEUTIC ACTIVITIES: CPT

## 2023-12-07 ASSESSMENT — PAIN SCALES - GENERAL: PAINLEVEL_OUTOF10: 0

## 2024-01-08 ENCOUNTER — APPOINTMENT (OUTPATIENT)
Dept: PHYSICAL THERAPY | Age: 79
End: 2024-01-08
Payer: MEDICARE

## 2024-01-09 ENCOUNTER — HOSPITAL ENCOUNTER (OUTPATIENT)
Dept: PHYSICAL THERAPY | Age: 79
Setting detail: RECURRING SERIES
End: 2024-01-09
Payer: MEDICARE

## 2024-01-09 NOTE — PROGRESS NOTES
Bety Ceballos  : 1945  Primary: [employer] Medicare-advantage Ppo  Secondary:  [facility] @ [address]  [facility] [address]  Phone: [phone]  Fax: [phone]    PT Visit Info:    Total # of Visits to Date: 8  Progress Note Due Date: 24 (or at 10th visit)  Canceled Appointment: 1     OT Visit Info:  No data recorded    OUTPATIENT THERAPY: 2024  Episode  Appt Desk      Bety Rosales Tucker cancelled her appointment for today due to weather concerns. Will plan to follow up next during next appointment.    Thank you,  Elif Baires PT    Future Appointments   Date Time Provider Department Center   2024 10:00 AM Elif Baires, PT [facility]

## 2024-01-14 DIAGNOSIS — M25.562 LEFT KNEE PAIN, UNSPECIFIED CHRONICITY: ICD-10-CM

## 2024-01-14 DIAGNOSIS — Z96.652 PRESENCE OF LEFT ARTIFICIAL KNEE JOINT: ICD-10-CM

## 2024-01-15 ENCOUNTER — HOSPITAL ENCOUNTER (OUTPATIENT)
Dept: PHYSICAL THERAPY | Age: 79
Setting detail: RECURRING SERIES
Discharge: HOME | End: 2024-01-18
Payer: MEDICARE

## 2024-01-15 PROCEDURE — 97530 THERAPEUTIC ACTIVITIES: CPT

## 2024-01-15 PROCEDURE — 97110 THERAPEUTIC EXERCISES: CPT

## 2024-01-15 RX ORDER — CELECOXIB 200 MG/1
200 CAPSULE ORAL DAILY
Qty: 30 CAPSULE | Refills: 3 | OUTPATIENT
Start: 2024-01-15

## 2024-01-15 ASSESSMENT — PAIN SCALES - GENERAL: PAINLEVEL_OUTOF10: 0

## 2024-01-15 NOTE — THERAPY DISCHARGE
Bety Ceballos  : 1945  Primary: [employer]-advantage Ppo (Medicare Managed)  Secondary:  [facility] @ [address]  [facility] [address]  Phone: [phone]  Fax: [phone] Plan Frequency: 1x/week for 90 days    Plan of Care/Certification Expiration Date: 24      PT Visit Info:  Plan Frequency: 1x/week for 90 days  Plan of Care/Certification Expiration Date: 24  Total # of Visits to Date: 9  Progress Note Due Date: 24 (or at 10th visit)  Canceled Appointment: 1      Visit Count:  9    OUTPATIENT PHYSICAL THERAPY:  Discharge Summary 1/15/2024  Episode (PFPT) Appt Desk         Treatment Diagnosis:    Other lack of coordination  Urge incontinence  Full incontinence of feces  Contributing Diagnosis:  Constipation, unspecified (K59.00), Hesitancy of micturition (R39.11), Lower abdominal pain, unspecified (R10.30), Nocturia (R35.1), Overactive bladder (Detrusor muscle hyperactivity) (N32.81), Pelvic and perineal pain (R10.2), and Pelvic floor dysfunction in female (M62.98)  Medical/Referring Diagnosis:  Urge incontinence [N39.41]  OAB (overactive bladder) [N32.81]  Full incontinence of feces [R15.9]  Constipation [K59.00]  Referring Physician:  Nadeen Thompson APRN - NP MD Orders:  PT Eval and Treat   Return MD Appt: 2024  Date of Onset:  Onset Date:  (Chronic)      Allergies:  Patient has no allergy information on record.Codeine, bee stings, sulfa meds  Restrictions/Precautions:    Restrictions/Precautions: None  Required Braces or Orthoses?: No        Medications Last Reviewed:  1/15/2024     SUBJECTIVE   History of Injury/Illness (Reason for Referral):  Ms. Ceballos is a 78 yo female referred to pelvic floor physical therapy (PFPT) by Nadeen Thompson,* 2/2 Urge incontinence [N39.41]  OAB (overactive bladder) [N32.81]  Full incontinence of feces [R15.9]  Constipation [K59.00].    AT INITIAL EVALUATION

## 2024-01-15 NOTE — PROGRESS NOTES
reports good understanding of plan of care, as well as prescribed home exercise program (DB with theraband, PFM activation exercises, trunk and pelvic mobility exercises per patient comfort (stay in painfree ROM), bridge + PFM per comfort, urinary urge suppression as needed, body/breathing mechanics, sleep hygiene, timed voiding (Every 3.5 hours), constipation and bowel health management). Pt is independent with HEP at this time and is confident in her ability to continue to manage symptoms independently. Will discharge today. All questions were answered to pt's satisfaction. Pt was invited to call with any further questions or concerns.  Communication/Consultation:  None today  Equipment provided today:  HEP    >Total Treatment Billable Duration: 58 minutes  Time In: 1003  Time Out: 1105    Elif Baires PT       Charge Capture   Post Session Pain  PT Visit Info  [facility] Portal  MD Guidelines  Scanned Media  Benefits  MyChart    No future appointments.

## 2024-07-16 ENCOUNTER — OFFICE VISIT (OUTPATIENT)
Dept: ORTHOPEDIC SURGERY | Age: 79
End: 2024-07-16
Payer: MEDICARE

## 2024-07-16 DIAGNOSIS — Z96.652 PRESENCE OF LEFT ARTIFICIAL KNEE JOINT: Primary | ICD-10-CM

## 2024-07-16 PROCEDURE — 1123F ACP DISCUSS/DSCN MKR DOCD: CPT | Performed by: ORTHOPAEDIC SURGERY

## 2024-07-16 PROCEDURE — 99214 OFFICE O/P EST MOD 30 MIN: CPT | Performed by: ORTHOPAEDIC SURGERY

## 2024-07-16 NOTE — PROGRESS NOTES
There is no significant instability.  There is no evidence of infection.  I have recommended referral to pain management.  I have also recommended that she see our new partner Dr. Terrazas to see if he agrees with the diagnosis as I see no need for any surgical intervention.      4--this is an undiagnosed new problem with uncertain prognosis

## 2024-07-31 NOTE — PROGRESS NOTES
[facility]  Consultation Note    Patient ID:  Name: Bety Ceballos  MRN: [id number]  AGE: 78 y.o.  : 1945    Date of Consultation:  2024    CC:   Chief Complaint   Patient presents with    Knee Pain         HPI:  Ms. Ceballos is a 78-year-old female who presents today for evaluation of knee pain.  She has a history of bilateral knee replacement.  She did very well with knee replacement on the right.  However, she has had persistent pain in the left knee.  The pain is in the left medial knee.  It severely limits her activity.  The pain is aggravated when she sits in her recliner too long with her legs elevated.  It is also aggravated being on her feet too long or walking.  The symptoms are alleviated when she is sits in a recliner begins to raise her legs.  She characterizes the pain as it gives away.  The pain awakens her at night.  She rates the pain as high as 9/10 in severity.  She is taking gabapentin 100 mg 3 times a day.  It helps some but makes her sleepy.  She is also on Cymbalta and Celebrex.  Baclofen makes her sleepy.  Formal physical therapy did not help.  She has performed physician guided home exercises daily since 2023 and continuing currently.  Those have not helped.  She had lab testing and radiologic imaging, and the knee replacement specialist did not feel there was a complication related to her hardware to account for her persistent pain.    X-rays of the left knee were taken by another provider 2024.  I did review these films.  She has evidence of left total knee hardware in place without complication.     Past Medical History Includes: No past medical history on file., No past surgical history on file.  Family History: No family history on file.   Social History:   Social History     Tobacco Use    Smoking status: Not on file    Smokeless tobacco: Not on file   Substance Use Topics    Alcohol use: Not on file       ALLERGIES:

## 2024-08-01 ENCOUNTER — TELEPHONE (OUTPATIENT)
Dept: ORTHOPEDIC SURGERY | Age: 79
End: 2024-08-01

## 2024-08-01 ENCOUNTER — OFFICE VISIT (OUTPATIENT)
Dept: ORTHOPEDIC SURGERY | Age: 79
End: 2024-08-01
Payer: MEDICARE

## 2024-08-01 DIAGNOSIS — M25.562 LEFT KNEE PAIN, UNSPECIFIED CHRONICITY: Primary | ICD-10-CM

## 2024-08-01 DIAGNOSIS — Z96.652 PRESENCE OF LEFT ARTIFICIAL KNEE JOINT: ICD-10-CM

## 2024-08-01 DIAGNOSIS — M25.512 LEFT SHOULDER PAIN, UNSPECIFIED CHRONICITY: ICD-10-CM

## 2024-08-01 PROCEDURE — 99204 OFFICE O/P NEW MOD 45 MIN: CPT | Performed by: PHYSICAL MEDICINE & REHABILITATION

## 2024-08-01 PROCEDURE — 1123F ACP DISCUSS/DSCN MKR DOCD: CPT | Performed by: PHYSICAL MEDICINE & REHABILITATION

## 2024-08-02 DIAGNOSIS — Z96.652 PRESENCE OF LEFT ARTIFICIAL KNEE JOINT: ICD-10-CM

## 2024-08-02 DIAGNOSIS — M25.562 LEFT KNEE PAIN, UNSPECIFIED CHRONICITY: ICD-10-CM

## 2024-08-05 RX ORDER — GABAPENTIN 100 MG/1
100 CAPSULE ORAL 3 TIMES DAILY
Qty: 90 CAPSULE | Refills: 0 | Status: SHIPPED | OUTPATIENT
Start: 2024-08-05 | End: 2024-09-04

## 2024-08-12 ENCOUNTER — OFFICE VISIT (OUTPATIENT)
Dept: ORTHOPEDIC SURGERY | Age: 79
End: 2024-08-12
Payer: MEDICARE

## 2024-08-12 DIAGNOSIS — M76.892 PES ANSERINUS TENDINITIS OF BOTH LOWER EXTREMITIES: Primary | ICD-10-CM

## 2024-08-12 DIAGNOSIS — M76.891 PES ANSERINUS TENDINITIS OF BOTH LOWER EXTREMITIES: Primary | ICD-10-CM

## 2024-08-12 PROCEDURE — 1123F ACP DISCUSS/DSCN MKR DOCD: CPT | Performed by: ORTHOPAEDIC SURGERY

## 2024-08-12 PROCEDURE — 99214 OFFICE O/P EST MOD 30 MIN: CPT | Performed by: ORTHOPAEDIC SURGERY

## 2024-08-12 RX ORDER — CELECOXIB 200 MG/1
200 CAPSULE ORAL 2 TIMES DAILY
Qty: 180 CAPSULE | Refills: 1 | Status: SHIPPED | OUTPATIENT
Start: 2024-08-12

## 2024-08-12 RX ORDER — METHYLPREDNISOLONE 4 MG/1
TABLET ORAL
Qty: 1 KIT | Refills: 0 | Status: SHIPPED | OUTPATIENT
Start: 2024-08-12 | End: 2024-08-18

## 2024-08-12 NOTE — PROGRESS NOTES
extremities.  Skin: Skin appears well healed without erythema, induration or drainage  Extremities:  Patient ambulates with an antalgic gait.  Able to slr.  Knee rom 0-130, mild laxity to vv stress at 0, moderate translation with ap shuck.  Significant ttp about the medial aspect of the knee bilaterally left worse than right particularly over pes tendons of her medial hamstrings.  No effusion      Imaging (obtained today or previously):    Heading: XR left Knee 3-4 View  Views: AP bilateral knee, skiers PA bilateral knees, lateral knee, sunrise view  Clinical indication: Knee Pain   Findings: Xrays of the knees obtained today or previously show normal appearing  left knee replacement without obvious evidence of loosening/hardware failure. No obvious fracture.  S and N  Impression: Normal appearing total knee replacement without obvious evidence of pathology    Duc Myers MD    Assessment:   Bilateral pes tendonititis    Plan:   The diagnosis discussed with her.  Her history and exam as well as imaging are consistent with a total knee arthroplasty is well-fixed without evidence of hardware failure or loosening.  She does have substantial pes tendinitis.  I think this is related to deconditioning bilaterally.  I refilled her Celebrex and prescribed a Medrol Dosepak.  Additionally instructed her to start formal physical therapy.  This will be critical for her to get her hamstring stronger.  I did ask her to to inquire about dry needling particular about her pes tendons.  She will follow-up in 6 weeks for repeat evaluation.  We can consider an injection at that time.  Standard GI cardiac and kidney side effects for Celebrex reviewed.      Signed By: Duc Myers MD  August 12, 2024

## 2024-08-20 ENCOUNTER — EVALUATION (OUTPATIENT)
Age: 79
End: 2024-08-20
Payer: MEDICARE

## 2024-08-20 DIAGNOSIS — M25.562 ACUTE PAIN OF BOTH KNEES: Primary | ICD-10-CM

## 2024-08-20 DIAGNOSIS — M76.891 PES ANSERINUS TENDINITIS OF BOTH LOWER EXTREMITIES: ICD-10-CM

## 2024-08-20 DIAGNOSIS — M76.892 PES ANSERINUS TENDINITIS OF BOTH LOWER EXTREMITIES: ICD-10-CM

## 2024-08-20 DIAGNOSIS — M25.561 ACUTE PAIN OF BOTH KNEES: Primary | ICD-10-CM

## 2024-08-20 DIAGNOSIS — Z74.09 IMPAIRED FUNCTIONAL MOBILITY, BALANCE, GAIT, AND ENDURANCE: ICD-10-CM

## 2024-08-20 DIAGNOSIS — R26.9 ABNORMALITY OF GAIT: ICD-10-CM

## 2024-08-20 DIAGNOSIS — M62.81 MUSCLE WEAKNESS: ICD-10-CM

## 2024-08-20 PROCEDURE — 97162 PT EVAL MOD COMPLEX 30 MIN: CPT | Performed by: PHYSICAL THERAPIST

## 2024-08-20 PROCEDURE — 97110 THERAPEUTIC EXERCISES: CPT | Performed by: PHYSICAL THERAPIST

## 2024-08-20 NOTE — PROGRESS NOTES
[facility]  [address] [place] [id number]  Dept: [phone]      Physical Therapy Initial Assessment     Referring MD: Duc Myers MD  Diagnosis:     ICD-10-CM    1. Acute pain of both knees  M25.561     M25.562       2. Muscle weakness  M62.81       3. Abnormality of gait  R26.9       4. Impaired functional mobility, balance, gait, and endurance  Z74.09          Therapy precautions:Latex allergy  Co-morbidities affecting plan of care: s/p B TKA    Payor: Payor: [employer] /  /  /  Billing pattern: Government- total time   Total Timed Codes: 10 min, Total Treatment Time: 40 min Modifier needed: No    Episode visit count:  1     PERTINENT MEDICAL HISTORY     Past medical and surgical history:   Past Medical History:   Diagnosis Date    Arthritis 1997    ongoing    Cancer (HCC) 04/23/2010    Rt. lumpectomy    Thyroid disease 2001    ongoing      Past Surgical History:   Procedure Laterality Date    KNEE SURGERY  10/09/2019    left knee TKR     Medications: reviewed in chart   Allergies:   Allergies   Allergen Reactions    Latex Rash     Localized reaction to where latex contacts     \"redness\"    Bee Pollen Anaphylaxis    Sulfa Antibiotics Hives, Rash and Shortness Of Breath    Sulfasalazine Hives and Shortness Of Breath    Adhesive Tape      Redness, sometimes peels skin    Ciprofloxacin Other (See Comments)     Tendonitis    Trazodone Hallucinations    Codeine Dizziness or Vertigo, Headaches and Nausea And Vomiting    Ergotamine-Caffeine Nausea And Vomiting    Hydrocodone-Acetaminophen Nausea And Vomiting    Oxycodone Nausea And Vomiting          SUBJECTIVE     Chief complaints/history of injury:    Date symptoms began: 2019  Nature of condition: Chronic (continuous duration > 3 months)  Primary cause of current episode: Unspecified  How did symptoms start: Pt underwent R TKA 2013 with good recovery after surgery. Pt s/p L TKA 2019 through [facility] with

## 2024-08-23 ENCOUNTER — TREATMENT (OUTPATIENT)
Age: 79
End: 2024-08-23

## 2024-08-23 DIAGNOSIS — M76.891 PES ANSERINUS TENDINITIS OF BOTH LOWER EXTREMITIES: ICD-10-CM

## 2024-08-23 DIAGNOSIS — M25.562 ACUTE PAIN OF BOTH KNEES: Primary | ICD-10-CM

## 2024-08-23 DIAGNOSIS — M25.561 ACUTE PAIN OF BOTH KNEES: Primary | ICD-10-CM

## 2024-08-23 DIAGNOSIS — M76.892 PES ANSERINUS TENDINITIS OF BOTH LOWER EXTREMITIES: ICD-10-CM

## 2024-08-23 DIAGNOSIS — R26.9 ABNORMALITY OF GAIT: ICD-10-CM

## 2024-08-23 DIAGNOSIS — Z74.09 IMPAIRED FUNCTIONAL MOBILITY, BALANCE, GAIT, AND ENDURANCE: ICD-10-CM

## 2024-08-23 DIAGNOSIS — M62.81 MUSCLE WEAKNESS: ICD-10-CM

## 2024-08-23 NOTE — PROGRESS NOTES
[facility]  [address] [place] [id number]  Dept: [phone]      Physical Therapy Daily Note     Referring MD: Duc Myers MD  Diagnosis:     ICD-10-CM    1. Acute pain of both knees  M25.561     M25.562       2. Muscle weakness  M62.81       3. Abnormality of gait  R26.9       4. Impaired functional mobility, balance, gait, and endurance  Z74.09       5. Pes anserinus tendinitis of both lower extremities [M76.891, M76.892]  M76.891     M76.892          Therapy precautions:Latex allergy  Co-morbidities affecting plan of care: s/p B TKA  Chief complaints/history of injury: Pt underwent R TKA 2013 with good recovery after surgery. Pt s/p L TKA 2019 through [facility] with persistent pain afterwards that did not improve with physical therapy, activity modification, Tylenol, and Celebrex. Pt sought a second opinion and saw Dr. Licea 5/2023. Ultrasound demonstrated small popliteal cyst and infection work up was negative. Pt referred to Dr. Myers and to Dr. Nam for possible nerve ablation of infrapatellar branch of the saphenous nerve. Pt saw Dr. Nam 8/1/24 with recommendation for possible L infrapatellar saphenous nerve block with trial of left IPS peripheral nerve stimulator if it helps. Pt saw Dr. Myers 8/12/24 with clinical impression of B pes tendinitis. She was referred to PT for LE strengthening with focus on hamstrings and consideration of dry needling at pes tendons.  Describe current symptoms: Pain behind both knee caps, feels like grit in the knees with sit-stand, L leg feels weaker and R leg has become weaker due to decreased activity, knees feel unstable, ear of falling. Pt also with new onset lateral L knee pain that  started ~2-3 weeks ago and is slowly improving  Patient Stated Goals: feel more stable when walking, decrease pain    Payor: Payor: [employer]ARE /  /  /  Billing pattern: Government- total time    Total Timed Procedure Codes: 40

## 2024-08-28 ENCOUNTER — TREATMENT (OUTPATIENT)
Age: 79
End: 2024-08-28
Payer: MEDICARE

## 2024-08-28 DIAGNOSIS — Z74.09 IMPAIRED FUNCTIONAL MOBILITY, BALANCE, GAIT, AND ENDURANCE: ICD-10-CM

## 2024-08-28 DIAGNOSIS — M76.891 PES ANSERINUS TENDINITIS OF BOTH LOWER EXTREMITIES: ICD-10-CM

## 2024-08-28 DIAGNOSIS — M76.892 PES ANSERINUS TENDINITIS OF BOTH LOWER EXTREMITIES: ICD-10-CM

## 2024-08-28 DIAGNOSIS — M25.562 ACUTE PAIN OF BOTH KNEES: Primary | ICD-10-CM

## 2024-08-28 DIAGNOSIS — R26.9 ABNORMALITY OF GAIT: ICD-10-CM

## 2024-08-28 DIAGNOSIS — M25.561 ACUTE PAIN OF BOTH KNEES: Primary | ICD-10-CM

## 2024-08-28 DIAGNOSIS — M62.81 MUSCLE WEAKNESS: ICD-10-CM

## 2024-08-28 PROCEDURE — 97110 THERAPEUTIC EXERCISES: CPT | Performed by: PHYSICAL THERAPIST

## 2024-08-28 PROCEDURE — 97140 MANUAL THERAPY 1/> REGIONS: CPT | Performed by: PHYSICAL THERAPIST

## 2024-08-28 NOTE — PROGRESS NOTES
[facility]  [address] [place] [id number]  Dept: [phone]      Physical Therapy Daily Note     Referring MD: Duc Myers MD  Diagnosis:     ICD-10-CM    1. Acute pain of both knees  M25.561     M25.562       2. Muscle weakness  M62.81       3. Abnormality of gait  R26.9       4. Impaired functional mobility, balance, gait, and endurance  Z74.09       5. Pes anserinus tendinitis of both lower extremities [M76.891, M76.892]  M76.891     M76.892          Therapy precautions:Latex allergy  Co-morbidities affecting plan of care: s/p B TKA  Chief complaints/history of injury: Pt underwent R TKA 2013 with good recovery after surgery. Pt s/p L TKA 2019 through [facility] with persistent pain afterwards that did not improve with physical therapy, activity modification, Tylenol, and Celebrex. Pt sought a second opinion and saw Dr. Licea 5/2023. Ultrasound demonstrated small popliteal cyst and infection work up was negative. Pt referred to Dr. Myers and to Dr. Nam for possible nerve ablation of infrapatellar branch of the saphenous nerve. Pt saw Dr. Nam 8/1/24 with recommendation for possible L infrapatellar saphenous nerve block with trial of left IPS peripheral nerve stimulator if it helps. Pt saw Dr. Myers 8/12/24 with clinical impression of B pes tendinitis. She was referred to PT for LE strengthening with focus on hamstrings and consideration of dry needling at pes tendons.  Describe current symptoms: Pain behind both knee caps, feels like grit in the knees with sit-stand, L leg feels weaker and R leg has become weaker due to decreased activity, knees feel unstable, ear of falling. Pt also with new onset lateral L knee pain that  started ~2-3 weeks ago and is slowly improving  Patient Stated Goals: feel more stable when walking, decrease pain    Payor: Payor: [employer]ICARE /  /  /  Billing pattern: Government- total time    Total Timed Procedure Codes: 38  min, Total Time: 45 min Modifier needed: No  Episode visit count:  3     SUBJECTIVE     Pt reports that left leg feels weaker overall but R leg fatigues quicker with supine SLR.    Medications: no changes since last session    OBJECTIVE     Treatment provided today:  Therapeutic exercise (26637) x 30 min to develop ROM, strength, endurance and flexibility trunk and LEs.  Nustep level 6 x 7 min  Bridge w/ adductor squeeze H10sec x10, digging heels in  Supine quad set + SLR H10sec x 10 B  Sidelying hip abduction 2x6-8 B  Prone over table hip extension x 5 each knee extended and flexed B  Standing hamstring curls green CLX band 2x10 B    Manual therapy (11017) x 8 min utilizing techniques to improve joint and/or soft tissue mobility, ROM, and function as well as helping to decrease pain/spasms and swelling.  Palpation and assessment of soft tissue, muscles, and landmarks   STM B pes anserine tendon region and medial/distal thigh  ASSESSMENT     Pt has been compliant with her home program and is highly motivated to improve. She is very cognizant of form during exercise.    PLAN     Continue with manual therapy and strengthening .   Effective Dates/Duration: 8/20/2024 TO 10/19/2024 (60 days).    Frequency: 2x/week   Interventions may include but are not limited to:   (90167) Therapeutic exercise to develop ROM, strength, endurance and flexibility  (77332) Therapeutic activities using dynamic activities to improve function  (11246) Gait training to address mechanics, proper step length and weight shifting to improve household and community mobility as well as overall safety with ADLs  (10871) Manual therapy techniques to improve joint and/or soft tissue mobility, ROM, and function as well as helping to decrease pain/spasms and swelling  (64741) Neuromuscular reeducation addressing impaired balance, coordination, kinesthetic sense, posture and proprioception  (74525/20561) Dry needling for the management of

## 2024-08-30 ENCOUNTER — TREATMENT (OUTPATIENT)
Age: 79
End: 2024-08-30
Payer: MEDICARE

## 2024-08-30 DIAGNOSIS — M25.562 ACUTE PAIN OF BOTH KNEES: Primary | ICD-10-CM

## 2024-08-30 DIAGNOSIS — M76.892 PES ANSERINUS TENDINITIS OF BOTH LOWER EXTREMITIES: ICD-10-CM

## 2024-08-30 DIAGNOSIS — M76.891 PES ANSERINUS TENDINITIS OF BOTH LOWER EXTREMITIES: ICD-10-CM

## 2024-08-30 DIAGNOSIS — M25.561 ACUTE PAIN OF BOTH KNEES: Primary | ICD-10-CM

## 2024-08-30 DIAGNOSIS — Z74.09 IMPAIRED FUNCTIONAL MOBILITY, BALANCE, GAIT, AND ENDURANCE: ICD-10-CM

## 2024-08-30 DIAGNOSIS — M62.81 MUSCLE WEAKNESS: ICD-10-CM

## 2024-08-30 DIAGNOSIS — R26.9 ABNORMALITY OF GAIT: ICD-10-CM

## 2024-08-30 PROCEDURE — 97140 MANUAL THERAPY 1/> REGIONS: CPT | Performed by: PHYSICAL THERAPIST

## 2024-08-30 PROCEDURE — 97110 THERAPEUTIC EXERCISES: CPT | Performed by: PHYSICAL THERAPIST

## 2024-08-30 NOTE — PROGRESS NOTES
[facility]  [address] [place] [phone]  Dept: [phone]      Physical Therapy Daily Note     Referring MD: Duc Myers MD  Diagnosis:     ICD-10-CM    1. Acute pain of both knees  M25.561     M25.562       2. Muscle weakness  M62.81       3. Abnormality of gait  R26.9       4. Impaired functional mobility, balance, gait, and endurance  Z74.09       5. Pes anserinus tendinitis of both lower extremities [M76.891, M76.892]  M76.891     M76.892          Therapy precautions:Latex allergy  Co-morbidities affecting plan of care: s/p B TKA  Chief complaints/history of injury: Pt underwent R TKA 2013 with good recovery after surgery. Pt s/p L TKA 2019 through [facility] with persistent pain afterwards that did not improve with physical therapy, activity modification, Tylenol, and Celebrex. Pt sought a second opinion and saw Dr. Licea 5/2023. Ultrasound demonstrated small popliteal cyst and infection work up was negative. Pt referred to Dr. Myers and to Dr. Nam for possible nerve ablation of infrapatellar branch of the saphenous nerve. Pt saw Dr. Nam 8/1/24 with recommendation for possible L infrapatellar saphenous nerve block with trial of left IPS peripheral nerve stimulator if it helps. Pt saw Dr. Myers 8/12/24 with clinical impression of B pes tendinitis. She was referred to PT for LE strengthening with focus on hamstrings and consideration of dry needling at pes tendons.  Describe current symptoms: Pain behind both knee caps, feels like grit in the knees with sit-stand, L leg feels weaker and R leg has become weaker due to decreased activity, knees feel unstable, ear of falling. Pt also with new onset lateral L knee pain that  started ~2-3 weeks ago and is slowly improving  Patient Stated Goals: feel more stable when walking, decrease pain    Payor: Payor: [employer]ICARE /  /  /  Billing pattern: Government- total time    Total Timed Procedure Codes: 40  and community mobility as well as overall safety with ADLs  (82183) Manual therapy techniques to improve joint and/or soft tissue mobility, ROM, and function as well as helping to decrease pain/spasms and swelling  (40205) Neuromuscular reeducation addressing impaired balance, coordination, kinesthetic sense, posture and proprioception  (20560/20561) Dry needling for the management of neuromusculoskeletal pain and movement impairment  Home exercise program (HEP) development      GOALS     Short term goals to be met by 9/19/2024  (4 weeks):  Pt will demonstrate good recall of HEP requiring minimal verbal cuing for proper form and technique.  Improve LEFS to = 35/80, showing gains in ADLs and daily tasks.   Pt will perform 5 reps in 30 sec chair stand without excessive anterior weight shift and with light support of 1UE.    Long term goals to be met by 10/19/2024 (60 days):   Pt will be compliant and independent with a comprehensive HEP and activity progression.   Improve LEFS to 40/80 demonstrating decreased functional restrictions with ADLs, work and athletic activities.   Pt will perform 8 reps in 30 sec chair stand without excessive anterior weight shift and with light support of 1UE.  Pt will increase LE strength to at least 4/5 with MMT for improved stability in gait.     [employer]  Access Code: [email or web address]  URL: [email or web address]  Date: 08/30/2024  Prepared by: Patrica Botello    Exercises  - Clamshell  - 1 x daily - 2-3 sets - 5 reps - 3 hold  - Bridge on Heels  - 1 x daily - 2-3 sets - 5 reps - 3 hold  - Active Straight Leg Raise with Quad Set  - 1 x daily - 1 sets - 3-5 reps - 10 hold  - Prone Hip Extension on Table  - 5 x weekly - 1-2 sets - 5 reps - 3 hold  - Standing Hip Extension with Leg Bent and Support  - 5 x weekly - 1-2 sets - 5 reps - 3 hold  - Standing Hamstring Curl with Resistance  - 5 x weekly - 2 sets - 8 reps - 3 hold  - Standing Heel Raises  - 5 x weekly - 2 sets - 10 reps - 5

## 2024-09-04 ENCOUNTER — TREATMENT (OUTPATIENT)
Age: 79
End: 2024-09-04
Payer: MEDICARE

## 2024-09-04 DIAGNOSIS — M62.81 MUSCLE WEAKNESS: ICD-10-CM

## 2024-09-04 DIAGNOSIS — M25.561 ACUTE PAIN OF BOTH KNEES: Primary | ICD-10-CM

## 2024-09-04 DIAGNOSIS — M25.562 ACUTE PAIN OF BOTH KNEES: Primary | ICD-10-CM

## 2024-09-04 PROCEDURE — 97140 MANUAL THERAPY 1/> REGIONS: CPT | Performed by: PHYSICAL THERAPIST

## 2024-09-04 PROCEDURE — 97110 THERAPEUTIC EXERCISES: CPT | Performed by: PHYSICAL THERAPIST

## 2024-09-04 NOTE — PROGRESS NOTES
with ADLs  (43554) Manual therapy techniques to improve joint and/or soft tissue mobility, ROM, and function as well as helping to decrease pain/spasms and swelling  (95134) Neuromuscular reeducation addressing impaired balance, coordination, kinesthetic sense, posture and proprioception  (20560/20561) Dry needling for the management of neuromusculoskeletal pain and movement impairment  Home exercise program (HEP) development      GOALS     Short term goals to be met by 9/19/2024  (4 weeks):  Pt will demonstrate good recall of HEP requiring minimal verbal cuing for proper form and technique.  Improve LEFS to = 35/80, showing gains in ADLs and daily tasks.   Pt will perform 5 reps in 30 sec chair stand without excessive anterior weight shift and with light support of 1UE.    Long term goals to be met by 10/19/2024 (60 days):   Pt will be compliant and independent with a comprehensive HEP and activity progression.   Improve LEFS to 40/80 demonstrating decreased functional restrictions with ADLs, work and athletic activities.   Pt will perform 8 reps in 30 sec chair stand without excessive anterior weight shift and with light support of 1UE.  Pt will increase LE strength to at least 4/5 with MMT for improved stability in gait.     [employer]  Access Code: [email or web address]  URL: [email or web address]  Date: 08/30/2024  Prepared by: Patrica Botello    Exercises  - Clamshell  - 1 x daily - 2-3 sets - 5 reps - 3 hold  - Bridge on Heels  - 1 x daily - 2-3 sets - 5 reps - 3 hold  - Active Straight Leg Raise with Quad Set  - 1 x daily - 1 sets - 3-5 reps - 10 hold  - Prone Hip Extension on Table  - 5 x weekly - 1-2 sets - 5 reps - 3 hold  - Standing Hip Extension with Leg Bent and Support  - 5 x weekly - 1-2 sets - 5 reps - 3 hold  - Standing Hamstring Curl with Resistance  - 5 x weekly - 2 sets - 8 reps - 3 hold  - Standing Heel Raises  - 5 x weekly - 2 sets - 10 reps - 5 hold  - Standing Hip Abduction  - 5 x weekly - 1-2

## 2024-09-06 ENCOUNTER — TREATMENT (OUTPATIENT)
Age: 79
End: 2024-09-06

## 2024-09-06 DIAGNOSIS — M76.891 PES ANSERINUS TENDINITIS OF BOTH LOWER EXTREMITIES: ICD-10-CM

## 2024-09-06 DIAGNOSIS — M25.562 ACUTE PAIN OF BOTH KNEES: Primary | ICD-10-CM

## 2024-09-06 DIAGNOSIS — M76.892 PES ANSERINUS TENDINITIS OF BOTH LOWER EXTREMITIES: ICD-10-CM

## 2024-09-06 DIAGNOSIS — M25.561 ACUTE PAIN OF BOTH KNEES: Primary | ICD-10-CM

## 2024-09-06 DIAGNOSIS — Z74.09 IMPAIRED FUNCTIONAL MOBILITY, BALANCE, GAIT, AND ENDURANCE: ICD-10-CM

## 2024-09-06 DIAGNOSIS — R26.9 ABNORMALITY OF GAIT: ICD-10-CM

## 2024-09-06 DIAGNOSIS — M62.81 MUSCLE WEAKNESS: ICD-10-CM

## 2024-09-06 NOTE — PROGRESS NOTES
[facility]  [address] [place] [phone]  Dept: [phone]      Physical Therapy Daily Note     Referring MD: Duc Myers MD  Diagnosis:     ICD-10-CM    1. Acute pain of both knees  M25.561     M25.562       2. Muscle weakness  M62.81       3. Abnormality of gait  R26.9       4. Impaired functional mobility, balance, gait, and endurance  Z74.09       5. Pes anserinus tendinitis of both lower extremities [M76.891, M76.892]  M76.891     M76.892          Therapy precautions:Latex allergy  Co-morbidities affecting plan of care: s/p B TKA  Chief complaints/history of injury: Pt underwent R TKA 2013 with good recovery after surgery. Pt s/p L TKA 2019 through [facility] with persistent pain afterwards that did not improve with physical therapy, activity modification, Tylenol, and Celebrex. Pt sought a second opinion and saw Dr. Licea 5/2023. Ultrasound demonstrated small popliteal cyst and infection work up was negative. Pt referred to Dr. Myers and to Dr. Nam for possible nerve ablation of infrapatellar branch of the saphenous nerve. Pt saw Dr. Nam 8/1/24 with recommendation for possible L infrapatellar saphenous nerve block with trial of left IPS peripheral nerve stimulator if it helps. Pt saw Dr. Myers 8/12/24 with clinical impression of B pes tendinitis. She was referred to PT for LE strengthening with focus on hamstrings and consideration of dry needling at pes tendons.  Describe current symptoms: Pain behind both knee caps, feels like grit in the knees with sit-stand, L leg feels weaker and R leg has become weaker due to decreased activity, knees feel unstable, ear of falling. Pt also with new onset lateral L knee pain that  started ~2-3 weeks ago and is slowly improving  Patient Stated Goals: feel more stable when walking, decrease pain    Payor: Payor: [employer] MEDICARE /  /  /  Billing pattern: Government- total time    Total Timed Procedure Codes: 40

## 2024-09-09 ENCOUNTER — TREATMENT (OUTPATIENT)
Age: 79
End: 2024-09-09
Payer: MEDICARE

## 2024-09-09 DIAGNOSIS — R26.9 ABNORMALITY OF GAIT: ICD-10-CM

## 2024-09-09 DIAGNOSIS — M62.81 MUSCLE WEAKNESS: ICD-10-CM

## 2024-09-09 DIAGNOSIS — Z74.09 IMPAIRED FUNCTIONAL MOBILITY, BALANCE, GAIT, AND ENDURANCE: ICD-10-CM

## 2024-09-09 DIAGNOSIS — M25.562 ACUTE PAIN OF BOTH KNEES: Primary | ICD-10-CM

## 2024-09-09 DIAGNOSIS — M76.891 PES ANSERINUS TENDINITIS OF BOTH LOWER EXTREMITIES: ICD-10-CM

## 2024-09-09 DIAGNOSIS — M25.561 ACUTE PAIN OF BOTH KNEES: Primary | ICD-10-CM

## 2024-09-09 DIAGNOSIS — M76.892 PES ANSERINUS TENDINITIS OF BOTH LOWER EXTREMITIES: ICD-10-CM

## 2024-09-09 PROCEDURE — 97140 MANUAL THERAPY 1/> REGIONS: CPT | Performed by: PHYSICAL THERAPIST

## 2024-09-09 PROCEDURE — 97110 THERAPEUTIC EXERCISES: CPT | Performed by: PHYSICAL THERAPIST

## 2024-09-09 PROCEDURE — 97112 NEUROMUSCULAR REEDUCATION: CPT | Performed by: PHYSICAL THERAPIST

## 2024-09-12 ENCOUNTER — OFFICE VISIT (OUTPATIENT)
Dept: ORTHOPEDIC SURGERY | Age: 79
End: 2024-09-12
Payer: MEDICARE

## 2024-09-12 DIAGNOSIS — M25.512 LEFT SHOULDER PAIN, UNSPECIFIED CHRONICITY: Primary | ICD-10-CM

## 2024-09-12 PROCEDURE — 1123F ACP DISCUSS/DSCN MKR DOCD: CPT | Performed by: PHYSICIAN ASSISTANT

## 2024-09-12 PROCEDURE — 99203 OFFICE O/P NEW LOW 30 MIN: CPT | Performed by: PHYSICIAN ASSISTANT

## 2024-09-13 ENCOUNTER — TREATMENT (OUTPATIENT)
Age: 79
End: 2024-09-13
Payer: MEDICARE

## 2024-09-13 DIAGNOSIS — M76.891 PES ANSERINUS TENDINITIS OF BOTH LOWER EXTREMITIES: ICD-10-CM

## 2024-09-13 DIAGNOSIS — Z74.09 IMPAIRED FUNCTIONAL MOBILITY, BALANCE, GAIT, AND ENDURANCE: ICD-10-CM

## 2024-09-13 DIAGNOSIS — M76.892 PES ANSERINUS TENDINITIS OF BOTH LOWER EXTREMITIES: ICD-10-CM

## 2024-09-13 DIAGNOSIS — M25.562 ACUTE PAIN OF BOTH KNEES: Primary | ICD-10-CM

## 2024-09-13 DIAGNOSIS — R26.9 ABNORMALITY OF GAIT: ICD-10-CM

## 2024-09-13 DIAGNOSIS — M25.561 ACUTE PAIN OF BOTH KNEES: Primary | ICD-10-CM

## 2024-09-13 DIAGNOSIS — M62.81 MUSCLE WEAKNESS: ICD-10-CM

## 2024-09-13 PROCEDURE — 97140 MANUAL THERAPY 1/> REGIONS: CPT | Performed by: PHYSICAL THERAPIST

## 2024-09-13 PROCEDURE — 97110 THERAPEUTIC EXERCISES: CPT | Performed by: PHYSICAL THERAPIST

## 2024-09-17 ENCOUNTER — APPOINTMENT (OUTPATIENT)
Dept: ULTRASOUND IMAGING | Age: 79
End: 2024-09-17
Payer: MEDICARE

## 2024-09-17 ENCOUNTER — HOSPITAL ENCOUNTER (EMERGENCY)
Age: 79
Discharge: HOME | End: 2024-09-17
Attending: EMERGENCY MEDICINE
Payer: MEDICARE

## 2024-09-17 ENCOUNTER — TREATMENT (OUTPATIENT)
Age: 79
End: 2024-09-17

## 2024-09-17 ENCOUNTER — APPOINTMENT (OUTPATIENT)
Dept: GENERAL RADIOLOGY | Age: 79
End: 2024-09-17
Payer: MEDICARE

## 2024-09-17 VITALS
OXYGEN SATURATION: 96 % | DIASTOLIC BLOOD PRESSURE: 86 MMHG | WEIGHT: 198 LBS | BODY MASS INDEX: 31.08 KG/M2 | TEMPERATURE: 98.4 F | SYSTOLIC BLOOD PRESSURE: 130 MMHG | RESPIRATION RATE: 18 BRPM | HEART RATE: 78 BPM | HEIGHT: 67 IN

## 2024-09-17 DIAGNOSIS — M79.662 PAIN OF LEFT CALF: ICD-10-CM

## 2024-09-17 DIAGNOSIS — M25.561 CHRONIC PAIN OF RIGHT KNEE: ICD-10-CM

## 2024-09-17 DIAGNOSIS — M25.562 ACUTE PAIN OF LEFT KNEE: Primary | ICD-10-CM

## 2024-09-17 DIAGNOSIS — G89.29 CHRONIC PAIN OF RIGHT KNEE: ICD-10-CM

## 2024-09-17 PROCEDURE — 99284 EMERGENCY DEPT VISIT MOD MDM: CPT

## 2024-09-17 PROCEDURE — 73562 X-RAY EXAM OF KNEE 3: CPT

## 2024-09-17 PROCEDURE — 93970 EXTREMITY STUDY: CPT

## 2024-09-17 ASSESSMENT — ENCOUNTER SYMPTOMS
SHORTNESS OF BREATH: 0
ABDOMINAL PAIN: 0
VOMITING: 0
COUGH: 0
NAUSEA: 0

## 2024-09-17 ASSESSMENT — PAIN SCALES - GENERAL
PAINLEVEL_OUTOF10: 5
PAINLEVEL_OUTOF10: 8

## 2024-09-17 ASSESSMENT — PAIN DESCRIPTION - LOCATION: LOCATION: KNEE

## 2024-09-17 ASSESSMENT — PAIN DESCRIPTION - DESCRIPTORS: DESCRIPTORS: ACHING;DISCOMFORT;TENDER

## 2024-09-17 ASSESSMENT — PAIN DESCRIPTION - FREQUENCY: FREQUENCY: INTERMITTENT

## 2024-09-17 ASSESSMENT — PAIN DESCRIPTION - PAIN TYPE: TYPE: ACUTE PAIN;CHRONIC PAIN

## 2024-09-17 ASSESSMENT — PAIN DESCRIPTION - ORIENTATION: ORIENTATION: LEFT;RIGHT

## 2024-09-17 ASSESSMENT — LIFESTYLE VARIABLES
HOW OFTEN DO YOU HAVE A DRINK CONTAINING ALCOHOL: MONTHLY OR LESS
HOW MANY STANDARD DRINKS CONTAINING ALCOHOL DO YOU HAVE ON A TYPICAL DAY: 1 OR 2

## 2024-09-17 ASSESSMENT — PAIN - FUNCTIONAL ASSESSMENT: PAIN_FUNCTIONAL_ASSESSMENT: 0-10

## 2024-09-18 ENCOUNTER — TELEPHONE (OUTPATIENT)
Dept: ORTHOPEDIC SURGERY | Age: 79
End: 2024-09-18

## 2024-09-18 ENCOUNTER — TELEPHONE (OUTPATIENT)
Age: 79
End: 2024-09-18

## 2024-09-19 ENCOUNTER — TREATMENT (OUTPATIENT)
Age: 79
End: 2024-09-19
Payer: MEDICARE

## 2024-09-19 ENCOUNTER — TELEPHONE (OUTPATIENT)
Dept: ORTHOPEDIC SURGERY | Age: 79
End: 2024-09-19

## 2024-09-19 DIAGNOSIS — Z74.09 IMPAIRED FUNCTIONAL MOBILITY, BALANCE, GAIT, AND ENDURANCE: ICD-10-CM

## 2024-09-19 DIAGNOSIS — M25.562 ACUTE PAIN OF BOTH KNEES: Primary | ICD-10-CM

## 2024-09-19 DIAGNOSIS — M76.891 PES ANSERINUS TENDINITIS OF BOTH LOWER EXTREMITIES: ICD-10-CM

## 2024-09-19 DIAGNOSIS — M76.892 PES ANSERINUS TENDINITIS OF BOTH LOWER EXTREMITIES: ICD-10-CM

## 2024-09-19 DIAGNOSIS — M62.81 MUSCLE WEAKNESS: ICD-10-CM

## 2024-09-19 DIAGNOSIS — R26.9 ABNORMALITY OF GAIT: ICD-10-CM

## 2024-09-19 DIAGNOSIS — M25.561 ACUTE PAIN OF BOTH KNEES: Primary | ICD-10-CM

## 2024-09-19 PROCEDURE — 97140 MANUAL THERAPY 1/> REGIONS: CPT | Performed by: PHYSICAL THERAPIST

## 2024-09-19 PROCEDURE — 97110 THERAPEUTIC EXERCISES: CPT | Performed by: PHYSICAL THERAPIST

## 2024-09-24 ENCOUNTER — TREATMENT (OUTPATIENT)
Age: 79
End: 2024-09-24
Payer: MEDICARE

## 2024-09-24 DIAGNOSIS — R26.9 ABNORMALITY OF GAIT: ICD-10-CM

## 2024-09-24 DIAGNOSIS — M76.891 PES ANSERINUS TENDINITIS OF BOTH LOWER EXTREMITIES: ICD-10-CM

## 2024-09-24 DIAGNOSIS — Z74.09 IMPAIRED FUNCTIONAL MOBILITY, BALANCE, GAIT, AND ENDURANCE: ICD-10-CM

## 2024-09-24 DIAGNOSIS — M76.892 PES ANSERINUS TENDINITIS OF BOTH LOWER EXTREMITIES: ICD-10-CM

## 2024-09-24 DIAGNOSIS — M62.81 MUSCLE WEAKNESS: ICD-10-CM

## 2024-09-24 DIAGNOSIS — M25.561 ACUTE PAIN OF BOTH KNEES: Primary | ICD-10-CM

## 2024-09-24 DIAGNOSIS — M25.562 ACUTE PAIN OF BOTH KNEES: Primary | ICD-10-CM

## 2024-09-24 PROCEDURE — 97110 THERAPEUTIC EXERCISES: CPT | Performed by: PHYSICAL THERAPIST

## 2024-09-24 PROCEDURE — 97140 MANUAL THERAPY 1/> REGIONS: CPT | Performed by: PHYSICAL THERAPIST

## 2024-09-26 ENCOUNTER — TELEPHONE (OUTPATIENT)
Age: 79
End: 2024-09-26

## 2024-10-03 ENCOUNTER — OFFICE VISIT (OUTPATIENT)
Dept: ORTHOPEDIC SURGERY | Age: 79
End: 2024-10-03
Payer: MEDICARE

## 2024-10-03 VITALS — BODY MASS INDEX: 31.08 KG/M2 | WEIGHT: 198 LBS | HEIGHT: 67 IN

## 2024-10-03 DIAGNOSIS — M76.891 PES ANSERINUS TENDINITIS OF BOTH LOWER EXTREMITIES: Primary | ICD-10-CM

## 2024-10-03 DIAGNOSIS — M76.892 PES ANSERINUS TENDINITIS OF BOTH LOWER EXTREMITIES: Primary | ICD-10-CM

## 2024-10-03 PROCEDURE — 1123F ACP DISCUSS/DSCN MKR DOCD: CPT | Performed by: ORTHOPAEDIC SURGERY

## 2024-10-03 PROCEDURE — 99213 OFFICE O/P EST LOW 20 MIN: CPT | Performed by: ORTHOPAEDIC SURGERY

## 2024-10-03 NOTE — PROGRESS NOTES
extremities.  Skin: Skin appears well healed without erythema, induration or drainage  Extremities:  Patient ambulates with an antalgic gait.  Able to slr.  Knee rom 0-130, mild laxity to vv stress at 0, moderate translation with ap shuck.  Significant ttp about the medial aspect of the knee bilaterally left worse than right particularly over pes tendons of her medial hamstrings.  No effusion      Imaging (obtained today or previously):    Heading: XR left Knee 3-4 View  Views: AP bilateral knee, skiers PA bilateral knees, lateral knee, sunrise view  Clinical indication: Knee Pain   Findings: Xrays of the knees obtained today or previously show normal appearing  left knee replacement without obvious evidence of loosening/hardware failure. No obvious fracture.  S and N  Impression: Normal appearing total knee replacement without obvious evidence of pathology    Duc Myers MD    Assessment:   Bilateral pes tendonititis    Plan:   Overall she is doing reasonably well.  We did review that dry needling can be significantly helpful for her muscular problems related to her hamstrings.  I offered her a pes injection bilaterally to help however she would like to postpone this until after her oral surgery appointment.  She can follow-up as needed for consideration of the bilateral pes injections      Signed By: Duc Myers MD  October 3, 2024

## 2024-10-08 ENCOUNTER — TELEPHONE (OUTPATIENT)
Age: 79
End: 2024-10-08

## 2024-10-08 NOTE — TELEPHONE ENCOUNTER
Pt left voicemail requesting a second appt for this week. Called and scheduled appt for 10/9/24 at 11:45.

## 2024-10-09 ENCOUNTER — TREATMENT (OUTPATIENT)
Age: 79
End: 2024-10-09
Payer: MEDICARE

## 2024-10-09 DIAGNOSIS — M76.891 PES ANSERINUS TENDINITIS OF BOTH LOWER EXTREMITIES: ICD-10-CM

## 2024-10-09 DIAGNOSIS — M25.562 ACUTE PAIN OF BOTH KNEES: Primary | ICD-10-CM

## 2024-10-09 DIAGNOSIS — Z74.09 IMPAIRED FUNCTIONAL MOBILITY, BALANCE, GAIT, AND ENDURANCE: ICD-10-CM

## 2024-10-09 DIAGNOSIS — M25.561 ACUTE PAIN OF BOTH KNEES: Primary | ICD-10-CM

## 2024-10-09 DIAGNOSIS — R26.9 ABNORMALITY OF GAIT: ICD-10-CM

## 2024-10-09 DIAGNOSIS — M62.81 MUSCLE WEAKNESS: ICD-10-CM

## 2024-10-09 DIAGNOSIS — M76.892 PES ANSERINUS TENDINITIS OF BOTH LOWER EXTREMITIES: ICD-10-CM

## 2024-10-09 PROCEDURE — 97110 THERAPEUTIC EXERCISES: CPT | Performed by: PHYSICAL THERAPIST

## 2024-10-09 PROCEDURE — MISCDN2 MISCDN2: Performed by: PHYSICAL THERAPIST

## 2024-10-09 PROCEDURE — 97140 MANUAL THERAPY 1/> REGIONS: CPT | Performed by: PHYSICAL THERAPIST

## 2024-10-09 NOTE — PROGRESS NOTES
and with light support of 1UE.    Long term goals to be met by 10/19/2024 (60 days):   Pt will be compliant and independent with a comprehensive HEP and activity progression.   Improve LEFS to 40/80 demonstrating decreased functional restrictions with ADLs, work and athletic activities.   Pt will perform 8 reps in 30 sec chair stand without excessive anterior weight shift and with light support of 1UE.  Pt will increase LE strength to at least 4/5 with MMT for improved stability in gait.     [employer]  Access Code: [email or web address]  URL: [email or web address]  Date: 08/30/2024  Prepared by: Patrica Botello    Exercises  - Clamshell  - 1 x daily - 2-3 sets - 5 reps - 3 hold  - Bridge on Heels  - 1 x daily - 2-3 sets - 5 reps - 3 hold  - Active Straight Leg Raise with Quad Set  - 1 x daily - 1 sets - 3-5 reps - 10 hold  - Prone Hip Extension on Table  - 5 x weekly - 1-2 sets - 5 reps - 3 hold  - Standing Hip Extension with Leg Bent and Support  - 5 x weekly - 1-2 sets - 5 reps - 3 hold  - Standing Hamstring Curl with Resistance  - 5 x weekly - 2 sets - 8 reps - 3 hold  - Standing Heel Raises  - 5 x weekly - 2 sets - 10 reps - 5 hold  - Standing Hip Abduction  - 5 x weekly - 1-2 sets - 5 reps - 5 hold

## 2024-10-11 ENCOUNTER — TREATMENT (OUTPATIENT)
Age: 79
End: 2024-10-11

## 2024-10-11 DIAGNOSIS — M25.562 ACUTE PAIN OF BOTH KNEES: Primary | ICD-10-CM

## 2024-10-11 DIAGNOSIS — M76.892 PES ANSERINUS TENDINITIS OF BOTH LOWER EXTREMITIES: ICD-10-CM

## 2024-10-11 DIAGNOSIS — M62.81 MUSCLE WEAKNESS: ICD-10-CM

## 2024-10-11 DIAGNOSIS — R26.9 ABNORMALITY OF GAIT: ICD-10-CM

## 2024-10-11 DIAGNOSIS — M76.891 PES ANSERINUS TENDINITIS OF BOTH LOWER EXTREMITIES: ICD-10-CM

## 2024-10-11 DIAGNOSIS — Z74.09 IMPAIRED FUNCTIONAL MOBILITY, BALANCE, GAIT, AND ENDURANCE: ICD-10-CM

## 2024-10-11 DIAGNOSIS — M25.561 ACUTE PAIN OF BOTH KNEES: Primary | ICD-10-CM

## 2024-10-11 PROCEDURE — MISCDN2 MISCDN2: Performed by: PHYSICAL THERAPIST

## 2024-10-11 NOTE — PROGRESS NOTES
without injection(s); 3 or more muscles: Stimulating underlying myofascial trigger points, muscular and connective tissues for the management of neuromusculoskeletal pain and movement impairment   Patient was educated on dry needling treatment, expectations, and post-treatment instructions.    Notice of non-coverage signed: [x] Yes Go to Scanned Items  Dry needling precautions/contraindications: Reviewed- none noted    Needles size and location: 50-mm, B sartorius, gracilis, semitendinosus, pes anserine tendon                                             Needle count: 8 needles inserted/ 8 needles removed   Position: Supine with bolster under knees   Needle technique left in situ x 6 minutes    Electrical Stimulation Not performed   Patient Response: Patient experienced no adverse response to treatment.    Pre-Test: Sit-stand   Post-Test: Less pain with sit-stand     ASSESSMENT     Start of care: 8/20/2024   As of 10/11/2024, Bety Ceballos has attended 13 PT sessions. Pt's attendance has been consistent with plan of care. Pt has progressed  well   with treatment.  Pt initially progressed well and the had a setback with increased L knee pain 9/17/24 resulting in an emergency room visit. She has attended 4 sessions since that time with a significant decline in pain and improved ability to complete sit-stand. She has met 3/3 short term goals, 2/4 long term goals, has subjective reports of improving sit-stand since initiating with 3 more reps completed in 30 sec chair stand since evaluation and 20 point increase in LEFS score. Continued deficits include: Pain, Localized swelling/edema, Soft tissue restrictions, Strength deficits, Motor control deficits, Impaired transfers, Impaired gait, Impaired balance/proprioception, Decreased endurance/activity Tolerance, Restricted social activity, and Restricted recreational participation.  Pt has not achieved max rehab potential and would benefit from continued skilled PT to

## 2024-10-15 ENCOUNTER — TREATMENT (OUTPATIENT)
Age: 79
End: 2024-10-15
Payer: MEDICARE

## 2024-10-15 DIAGNOSIS — M76.892 PES ANSERINUS TENDINITIS OF BOTH LOWER EXTREMITIES: ICD-10-CM

## 2024-10-15 DIAGNOSIS — M62.81 MUSCLE WEAKNESS: ICD-10-CM

## 2024-10-15 DIAGNOSIS — R26.9 ABNORMALITY OF GAIT: ICD-10-CM

## 2024-10-15 DIAGNOSIS — M25.562 ACUTE PAIN OF BOTH KNEES: Primary | ICD-10-CM

## 2024-10-15 DIAGNOSIS — M25.561 ACUTE PAIN OF BOTH KNEES: Primary | ICD-10-CM

## 2024-10-15 DIAGNOSIS — M76.891 PES ANSERINUS TENDINITIS OF BOTH LOWER EXTREMITIES: ICD-10-CM

## 2024-10-15 DIAGNOSIS — Z74.09 IMPAIRED FUNCTIONAL MOBILITY, BALANCE, GAIT, AND ENDURANCE: ICD-10-CM

## 2024-10-15 PROCEDURE — 97140 MANUAL THERAPY 1/> REGIONS: CPT | Performed by: PHYSICAL THERAPIST

## 2024-10-15 PROCEDURE — 97110 THERAPEUTIC EXERCISES: CPT | Performed by: PHYSICAL THERAPIST

## 2024-10-15 NOTE — PROGRESS NOTES
[facility]  [address] [place] [id number]  Dept: [phone]      Physical Therapy Daily Note     Referring MD: Duc Myers MD  Diagnosis:     ICD-10-CM    1. Acute pain of both knees  M25.561     M25.562       2. Muscle weakness  M62.81       3. Abnormality of gait  R26.9       4. Impaired functional mobility, balance, gait, and endurance  Z74.09       5. Pes anserinus tendinitis of both lower extremities [M76.891, M76.892]  M76.891     M76.892            Therapy precautions:Latex allergy  Co-morbidities affecting plan of care: s/p B TKA  Chief complaints/history of injury: Pt underwent R TKA 2013 with good recovery after surgery. Pt s/p L TKA 2019 through [facility] with persistent pain afterwards that did not improve with physical therapy, activity modification, Tylenol, and Celebrex. Pt sought a second opinion and saw Dr. Licea 5/2023. Ultrasound demonstrated small popliteal cyst and infection work up was negative. Pt referred to Dr. Myers and to Dr. Nam for possible nerve ablation of infrapatellar branch of the saphenous nerve. Pt saw Dr. Nam 8/1/24 with recommendation for possible L infrapatellar saphenous nerve block with trial of left IPS peripheral nerve stimulator if it helps. Pt saw Dr. Myers 8/12/24 with clinical impression of B pes tendinitis. She was referred to PT for LE strengthening with focus on hamstrings and consideration of dry needling at pes tendons.  Describe current symptoms: Pain behind both knee caps, feels like grit in the knees with sit-stand, L leg feels weaker and R leg has become weaker due to decreased activity, knees feel unstable, ear of falling. Pt also with new onset lateral L knee pain that  started ~2-3 weeks ago and is slowly improving  Patient Stated Goals: feel more stable when walking, decrease pain    Payor: Payor: [employer] /  /  /  Billing pattern: Government- total time    Total Timed Procedure Codes: 40

## 2024-10-31 ENCOUNTER — TREATMENT (OUTPATIENT)
Age: 79
End: 2024-10-31

## 2024-10-31 DIAGNOSIS — M25.561 ACUTE PAIN OF BOTH KNEES: Primary | ICD-10-CM

## 2024-10-31 DIAGNOSIS — M76.892 PES ANSERINUS TENDINITIS OF BOTH LOWER EXTREMITIES: ICD-10-CM

## 2024-10-31 DIAGNOSIS — M76.891 PES ANSERINUS TENDINITIS OF BOTH LOWER EXTREMITIES: ICD-10-CM

## 2024-10-31 DIAGNOSIS — M62.81 MUSCLE WEAKNESS: ICD-10-CM

## 2024-10-31 DIAGNOSIS — R26.9 ABNORMALITY OF GAIT: ICD-10-CM

## 2024-10-31 DIAGNOSIS — M25.562 ACUTE PAIN OF BOTH KNEES: Primary | ICD-10-CM

## 2024-10-31 DIAGNOSIS — Z74.09 IMPAIRED FUNCTIONAL MOBILITY, BALANCE, GAIT, AND ENDURANCE: ICD-10-CM

## 2024-10-31 NOTE — PROGRESS NOTES
[facility]  [address] [place] [id number]  Dept: [phone]      Physical Therapy Daily Note     Referring MD: Duc Myers MD  Diagnosis:     ICD-10-CM    1. Acute pain of both knees  M25.561     M25.562       2. Muscle weakness  M62.81       3. Abnormality of gait  R26.9       4. Impaired functional mobility, balance, gait, and endurance  Z74.09       5. Pes anserinus tendinitis of both lower extremities [M76.891, M76.892]  M76.891     M76.892            Therapy precautions:Latex allergy  Co-morbidities affecting plan of care: s/p B TKA  Chief complaints/history of injury: Pt underwent R TKA 2013 with good recovery after surgery. Pt s/p L TKA 2019 through [facility] with persistent pain afterwards that did not improve with physical therapy, activity modification, Tylenol, and Celebrex. Pt sought a second opinion and saw Dr. Licea 5/2023. Ultrasound demonstrated small popliteal cyst and infection work up was negative. Pt referred to Dr. Myers and to Dr. Nam for possible nerve ablation of infrapatellar branch of the saphenous nerve. Pt saw Dr. Nma 8/1/24 with recommendation for possible L infrapatellar saphenous nerve block with trial of left IPS peripheral nerve stimulator if it helps. Pt saw Dr. Myers 8/12/24 with clinical impression of B pes tendinitis. She was referred to PT for LE strengthening with focus on hamstrings and consideration of dry needling at pes tendons.  Describe current symptoms: Pain behind both knee caps, feels like grit in the knees with sit-stand, L leg feels weaker and R leg has become weaker due to decreased activity, knees feel unstable, ear of falling. Pt also with new onset lateral L knee pain that  started ~2-3 weeks ago and is slowly improving  Patient Stated Goals: feel more stable when walking, decrease pain    Payor: Payor: [employer]ARE /  /  /  Billing pattern: Government- total time    Total Timed Procedure Codes: 40

## 2024-11-07 ENCOUNTER — TREATMENT (OUTPATIENT)
Age: 79
End: 2024-11-07
Payer: MEDICARE

## 2024-11-07 DIAGNOSIS — M62.81 MUSCLE WEAKNESS: ICD-10-CM

## 2024-11-07 DIAGNOSIS — M76.891 PES ANSERINUS TENDINITIS OF BOTH LOWER EXTREMITIES: ICD-10-CM

## 2024-11-07 DIAGNOSIS — M76.892 PES ANSERINUS TENDINITIS OF BOTH LOWER EXTREMITIES: ICD-10-CM

## 2024-11-07 DIAGNOSIS — M25.561 ACUTE PAIN OF BOTH KNEES: Primary | ICD-10-CM

## 2024-11-07 DIAGNOSIS — Z74.09 IMPAIRED FUNCTIONAL MOBILITY, BALANCE, GAIT, AND ENDURANCE: ICD-10-CM

## 2024-11-07 DIAGNOSIS — R26.9 ABNORMALITY OF GAIT: ICD-10-CM

## 2024-11-07 DIAGNOSIS — M25.562 ACUTE PAIN OF BOTH KNEES: Primary | ICD-10-CM

## 2024-11-07 PROCEDURE — 97140 MANUAL THERAPY 1/> REGIONS: CPT | Performed by: PHYSICAL THERAPIST

## 2024-11-07 PROCEDURE — 97110 THERAPEUTIC EXERCISES: CPT | Performed by: PHYSICAL THERAPIST

## 2024-11-11 ENCOUNTER — APPOINTMENT (RX ONLY)
Dept: URBAN - METROPOLITAN AREA CLINIC 329 | Facility: CLINIC | Age: 79
Setting detail: DERMATOLOGY
End: 2024-11-11

## 2024-11-11 DIAGNOSIS — Z12.83 ENCOUNTER FOR SCREENING FOR MALIGNANT NEOPLASM OF SKIN: ICD-10-CM

## 2024-11-11 DIAGNOSIS — D18.0 HEMANGIOMA: ICD-10-CM

## 2024-11-11 DIAGNOSIS — L57.8 OTHER SKIN CHANGES DUE TO CHRONIC EXPOSURE TO NONIONIZING RADIATION: ICD-10-CM

## 2024-11-11 DIAGNOSIS — D22 MELANOCYTIC NEVI: ICD-10-CM

## 2024-11-11 DIAGNOSIS — L81.4 OTHER MELANIN HYPERPIGMENTATION: ICD-10-CM

## 2024-11-11 DIAGNOSIS — L82.1 OTHER SEBORRHEIC KERATOSIS: ICD-10-CM

## 2024-11-11 PROBLEM — D22.5 MELANOCYTIC NEVI OF TRUNK: Status: ACTIVE | Noted: 2024-11-11

## 2024-11-11 PROBLEM — D22.71 MELANOCYTIC NEVI OF RIGHT LOWER LIMB, INCLUDING HIP: Status: ACTIVE | Noted: 2024-11-11

## 2024-11-11 PROBLEM — D22.61 MELANOCYTIC NEVI OF RIGHT UPPER LIMB, INCLUDING SHOULDER: Status: ACTIVE | Noted: 2024-11-11

## 2024-11-11 PROBLEM — D22.72 MELANOCYTIC NEVI OF LEFT LOWER LIMB, INCLUDING HIP: Status: ACTIVE | Noted: 2024-11-11

## 2024-11-11 PROBLEM — D18.01 HEMANGIOMA OF SKIN AND SUBCUTANEOUS TISSUE: Status: ACTIVE | Noted: 2024-11-11

## 2024-11-11 PROCEDURE — ? TREATMENT REGIMEN

## 2024-11-11 PROCEDURE — ? COUNSELING

## 2024-11-11 PROCEDURE — 99213 OFFICE O/P EST LOW 20 MIN: CPT

## 2024-11-11 PROCEDURE — ? FULL BODY SKIN EXAM

## 2024-11-11 ASSESSMENT — LOCATION SIMPLE DESCRIPTION DERM
LOCATION SIMPLE: LEFT UPPER ARM
LOCATION SIMPLE: LEFT CALF
LOCATION SIMPLE: ABDOMEN
LOCATION SIMPLE: RIGHT THIGH
LOCATION SIMPLE: UPPER BACK
LOCATION SIMPLE: RIGHT FOREARM
LOCATION SIMPLE: LEFT UPPER BACK
LOCATION SIMPLE: CHEST
LOCATION SIMPLE: RIGHT CLAVICULAR SKIN
LOCATION SIMPLE: LEFT POSTERIOR THIGH
LOCATION SIMPLE: ANTERIOR SCALP
LOCATION SIMPLE: RIGHT UPPER BACK
LOCATION SIMPLE: LEFT CHEEK
LOCATION SIMPLE: RIGHT UPPER ARM

## 2024-11-11 ASSESSMENT — LOCATION ZONE DERM
LOCATION ZONE: ARM
LOCATION ZONE: SCALP
LOCATION ZONE: TRUNK
LOCATION ZONE: FACE
LOCATION ZONE: LEG

## 2024-11-11 ASSESSMENT — LOCATION DETAILED DESCRIPTION DERM
LOCATION DETAILED: LEFT ANTERIOR DISTAL UPPER ARM
LOCATION DETAILED: LEFT PROXIMAL POSTERIOR THIGH
LOCATION DETAILED: UPPER STERNUM
LOCATION DETAILED: EPIGASTRIC SKIN
LOCATION DETAILED: RIGHT ANTERIOR DISTAL THIGH
LOCATION DETAILED: MID-FRONTAL SCALP
LOCATION DETAILED: RIGHT ANTERIOR DISTAL UPPER ARM
LOCATION DETAILED: RIGHT CLAVICULAR SKIN
LOCATION DETAILED: RIGHT PROXIMAL DORSAL FOREARM
LOCATION DETAILED: LEFT DISTAL CALF
LOCATION DETAILED: RIGHT VENTRAL DISTAL FOREARM
LOCATION DETAILED: LEFT INFERIOR UPPER BACK
LOCATION DETAILED: RIGHT SUPERIOR MEDIAL UPPER BACK
LOCATION DETAILED: LEFT DISTAL POSTERIOR THIGH
LOCATION DETAILED: LEFT CENTRAL MALAR CHEEK
LOCATION DETAILED: SUPERIOR THORACIC SPINE

## 2024-11-14 ENCOUNTER — TREATMENT (OUTPATIENT)
Age: 79
End: 2024-11-14

## 2024-11-14 ENCOUNTER — OFFICE VISIT (OUTPATIENT)
Dept: ORTHOPEDIC SURGERY | Age: 79
End: 2024-11-14

## 2024-11-14 VITALS — WEIGHT: 198 LBS | HEIGHT: 67 IN | BODY MASS INDEX: 31.08 KG/M2

## 2024-11-14 DIAGNOSIS — M25.561 ACUTE PAIN OF BOTH KNEES: Primary | ICD-10-CM

## 2024-11-14 DIAGNOSIS — M25.562 PAIN IN BOTH KNEES, UNSPECIFIED CHRONICITY: Primary | ICD-10-CM

## 2024-11-14 DIAGNOSIS — M76.892 PES ANSERINUS TENDINITIS OF BOTH LOWER EXTREMITIES: ICD-10-CM

## 2024-11-14 DIAGNOSIS — M62.81 MUSCLE WEAKNESS: ICD-10-CM

## 2024-11-14 DIAGNOSIS — M25.561 PAIN IN BOTH KNEES, UNSPECIFIED CHRONICITY: Primary | ICD-10-CM

## 2024-11-14 DIAGNOSIS — Z74.09 IMPAIRED FUNCTIONAL MOBILITY, BALANCE, GAIT, AND ENDURANCE: ICD-10-CM

## 2024-11-14 DIAGNOSIS — R26.9 ABNORMALITY OF GAIT: ICD-10-CM

## 2024-11-14 DIAGNOSIS — M76.891 PES ANSERINUS TENDINITIS OF BOTH LOWER EXTREMITIES: ICD-10-CM

## 2024-11-14 DIAGNOSIS — M25.562 ACUTE PAIN OF BOTH KNEES: Primary | ICD-10-CM

## 2024-11-14 RX ORDER — METHYLPREDNISOLONE ACETATE 40 MG/ML
40 INJECTION, SUSPENSION INTRA-ARTICULAR; INTRALESIONAL; INTRAMUSCULAR; SOFT TISSUE ONCE
Status: COMPLETED | OUTPATIENT
Start: 2024-11-14 | End: 2024-11-14

## 2024-11-14 RX ADMIN — METHYLPREDNISOLONE ACETATE 40 MG: 40 INJECTION, SUSPENSION INTRA-ARTICULAR; INTRALESIONAL; INTRAMUSCULAR; SOFT TISSUE at 11:02

## 2024-11-14 RX ADMIN — METHYLPREDNISOLONE ACETATE 40 MG: 40 INJECTION, SUSPENSION INTRA-ARTICULAR; INTRALESIONAL; INTRAMUSCULAR; SOFT TISSUE at 11:01

## 2024-11-14 NOTE — PROGRESS NOTES
Patient ID:  Bety Ceballos  [id number]  78 y.o.  1945    Today: November 14, 2024          Chief Complaint: Left Knee pain    HPI:       Bety Ceballos is a 78 y.o. female for repeat evaluation of her bilateral knee pain after total knee arthroplasty.  Overall she is doing better doing therapy diligently.  She is able to travel to [place] and do all things she would like to do however she still has refractory pain about the medial aspects of her knee particularly when getting up and out of the chair.  She would like to pursue injections in the pes tendons  Past Medical History:  Past Medical History:   Diagnosis Date    Arthritis 1997    ongoing    Cancer (HCC) 04/23/2010    Rt. lumpectomy    Thyroid disease 2001    ongoing       Past Surgical History:  Past Surgical History:   Procedure Laterality Date    KNEE SURGERY  10/09/2019    left knee TKR        Medications:     Prior to Admission medications    Medication Sig Start Date End Date Taking? Authorizing Provider   celecoxib (CELEBREX) 200 MG capsule Take 1 capsule by mouth 2 times daily 8/12/24   Duc Myers MD   gabapentin (NEURONTIN) 100 MG capsule Take 1 capsule by mouth 3 times daily for 30 days. 8/5/24 9/4/24  Natacha Chang PA   ALPRAZolam (XANAX) 0.5 MG tablet Take 1 tablet by mouth daily as needed. Max Daily Amount: 0.5 mg 3/10/23   Ayleen Hidalgo MD   ascorbic acid (VITAMIN C) 1000 MG tablet by Oral/Gastric Tube route    Ayleen Hidalgo MD   baclofen (LIORESAL) 10 MG tablet Take 0.5-1 tablets by mouth 3 times daily as needed  Patient not taking: Reported on 8/20/2024 5/4/21   Ayleen Hidalgo MD   vitamin D 25 MCG (1000 UT) CAPS Take by mouth    Ayleen Hidalgo MD   DULoxetine (CYMBALTA) 30 MG extended release capsule Take 1 capsule by mouth daily 4/4/13   Ayleen Hidalgo MD   cyanocobalamin 100 MCG tablet Take 1 tablet by mouth daily    Ayleen Hidalgo MD   celecoxib (CELEBREX) 200 MG

## 2024-11-14 NOTE — PROGRESS NOTES
- 2-3 sets - 5 reps - 3 hold  - Active Straight Leg Raise with Quad Set  - 1 x daily - 1 sets - 3-5 reps - 10 hold  - Clam with Resistance  - 4 x weekly - 2-3 sets - 10 reps - 5 hold  - Sidelying Hip Abduction  - 4 x weekly - 2 sets - 8-10 reps - 3 sec hold  - Prone Hip Extension on Table  - 4 x weekly - 1-2 sets - 5 reps - 3 hold  - Standing Hip Extension with Leg Bent and Support  - 4 x weekly - 1-2 sets - 5 reps - 3 hold  - Standing Hamstring Curl with Resistance  - 4 x weekly - 2 sets - 8 reps - 3 hold  - Standing Heel Raises  - 4 x weekly - 2 sets - 10 reps - 5 hold  - Standing Hip Abduction  - 4 x weekly - 1-2 sets - 5 reps - 5 hold

## 2024-11-21 ENCOUNTER — TREATMENT (OUTPATIENT)
Age: 79
End: 2024-11-21

## 2024-11-21 DIAGNOSIS — M25.562 ACUTE PAIN OF BOTH KNEES: Primary | ICD-10-CM

## 2024-11-21 DIAGNOSIS — M25.561 ACUTE PAIN OF BOTH KNEES: Primary | ICD-10-CM

## 2024-11-21 DIAGNOSIS — M62.81 MUSCLE WEAKNESS: ICD-10-CM

## 2024-11-21 DIAGNOSIS — M76.892 PES ANSERINUS TENDINITIS OF BOTH LOWER EXTREMITIES: ICD-10-CM

## 2024-11-21 DIAGNOSIS — Z74.09 IMPAIRED FUNCTIONAL MOBILITY, BALANCE, GAIT, AND ENDURANCE: ICD-10-CM

## 2024-11-21 DIAGNOSIS — R26.9 ABNORMALITY OF GAIT: ICD-10-CM

## 2024-11-21 DIAGNOSIS — M76.891 PES ANSERINUS TENDINITIS OF BOTH LOWER EXTREMITIES: ICD-10-CM

## 2025-01-28 ENCOUNTER — TELEPHONE (OUTPATIENT)
Dept: ORTHOPEDIC SURGERY | Age: 80
End: 2025-01-28

## 2025-01-28 NOTE — TELEPHONE ENCOUNTER
Left message on voicemail for patient to call regarding appointment.  Patient can be scheduled at her earliest convenience.

## 2025-01-30 ENCOUNTER — OFFICE VISIT (OUTPATIENT)
Dept: ORTHOPEDIC SURGERY | Age: 80
End: 2025-01-30
Payer: MEDICARE

## 2025-01-30 VITALS — BODY MASS INDEX: 32.8 KG/M2 | HEIGHT: 67 IN | WEIGHT: 209 LBS

## 2025-01-30 DIAGNOSIS — M25.561 PAIN IN BOTH KNEES, UNSPECIFIED CHRONICITY: ICD-10-CM

## 2025-01-30 DIAGNOSIS — M25.562 PAIN IN BOTH KNEES, UNSPECIFIED CHRONICITY: ICD-10-CM

## 2025-01-30 DIAGNOSIS — M76.891 PES ANSERINUS TENDINITIS OF BOTH LOWER EXTREMITIES: Primary | ICD-10-CM

## 2025-01-30 DIAGNOSIS — M76.892 PES ANSERINUS TENDINITIS OF BOTH LOWER EXTREMITIES: Primary | ICD-10-CM

## 2025-01-30 PROCEDURE — 99213 OFFICE O/P EST LOW 20 MIN: CPT | Performed by: ORTHOPAEDIC SURGERY

## 2025-01-30 PROCEDURE — 1123F ACP DISCUSS/DSCN MKR DOCD: CPT | Performed by: ORTHOPAEDIC SURGERY

## 2025-01-30 NOTE — PROGRESS NOTES
Patient ID:  eBty Ceballos  [id number]  79 y.o.  1945    Today: January 30, 2025          Chief Complaint: Left Knee pain    HPI:       Bety Ceballos is a 79 y.o. female for repeat evaluation of her bilateral knee pain after total knee arthroplasty.  She reports significant pain about the pes tendons medially which is causing her difficulty getting up and down stairs as well as instability.  She is curious she can a fall.  She is in no cystolith ambulation.  She is in formal physical therapy and considered injections.  She did think that she had some improvement with therapy last summer however she abruptly stopped and is unsure why.  She was set up for a pain management appointment by her PCP        Past Medical History:   Diagnosis Date    Arthritis 1997    ongoing    Cancer (HCC) 04/23/2010    Rt. lumpectomy    Thyroid disease 2001    ongoing       Past Surgical History:  Past Surgical History:   Procedure Laterality Date    KNEE SURGERY  10/09/2019    left knee TKR        Medications:     Prior to Admission medications    Medication Sig Start Date End Date Taking? Authorizing Provider   celecoxib (CELEBREX) 200 MG capsule Take 1 capsule by mouth 2 times daily 8/12/24   Duc Myers MD   gabapentin (NEURONTIN) 100 MG capsule Take 1 capsule by mouth 3 times daily for 30 days. 8/5/24 9/4/24  Natacha Chang PA   ALPRAZolam (XANAX) 0.5 MG tablet Take 1 tablet by mouth daily as needed. Max Daily Amount: 0.5 mg 3/10/23   Ayleen Hidalgo MD   ascorbic acid (VITAMIN C) 1000 MG tablet by Oral/Gastric Tube route    Ayleen Hidalgo MD   baclofen (LIORESAL) 10 MG tablet Take 0.5-1 tablets by mouth 3 times daily as needed  Patient not taking: Reported on 8/20/2024 5/4/21   Ayleen Hidalgo MD   vitamin D 25 MCG (1000 UT) CAPS Take by mouth    Ayleen Hidalgo MD   DULoxetine (CYMBALTA) 30 MG extended release capsule Take 1 capsule by mouth daily 4/4/13   Rocky